# Patient Record
Sex: FEMALE | Race: WHITE | NOT HISPANIC OR LATINO | Employment: OTHER | URBAN - METROPOLITAN AREA
[De-identification: names, ages, dates, MRNs, and addresses within clinical notes are randomized per-mention and may not be internally consistent; named-entity substitution may affect disease eponyms.]

---

## 2017-03-27 ENCOUNTER — HOSPITAL ENCOUNTER (OUTPATIENT)
Dept: RADIOLOGY | Facility: CLINIC | Age: 62
Discharge: HOME | End: 2017-03-27
Attending: OBSTETRICS & GYNECOLOGY
Payer: COMMERCIAL

## 2017-03-27 ENCOUNTER — OFFICE VISIT (OUTPATIENT)
Dept: OBSTETRICS AND GYNECOLOGY | Facility: CLINIC | Age: 62
End: 2017-03-27
Payer: COMMERCIAL

## 2017-03-27 VITALS — DIASTOLIC BLOOD PRESSURE: 82 MMHG | WEIGHT: 170 LBS | BODY MASS INDEX: 31.6 KG/M2 | SYSTOLIC BLOOD PRESSURE: 134 MMHG

## 2017-03-27 DIAGNOSIS — Z01.419 ROUTINE GYNECOLOGICAL EXAMINATION: Primary | ICD-10-CM

## 2017-03-27 DIAGNOSIS — Z12.31 VISIT FOR SCREENING MAMMOGRAM: ICD-10-CM

## 2017-03-27 DIAGNOSIS — N76.1 CHRONIC VAGINITIS: ICD-10-CM

## 2017-03-27 DIAGNOSIS — Z11.51 SCREENING FOR HPV (HUMAN PAPILLOMAVIRUS): ICD-10-CM

## 2017-03-27 DIAGNOSIS — Z12.4 CERVICAL CANCER SCREENING: ICD-10-CM

## 2017-03-27 PROCEDURE — 77067 SCR MAMMO BI INCL CAD: CPT | Mod: 26,,, | Performed by: RADIOLOGY

## 2017-03-27 PROCEDURE — 3079F DIAST BP 80-89 MM HG: CPT | Mod: S$GLB,,, | Performed by: OBSTETRICS & GYNECOLOGY

## 2017-03-27 PROCEDURE — 99999 PR PBB SHADOW E&M-NEW PATIENT-LVL III: CPT | Mod: PBBFAC,,, | Performed by: OBSTETRICS & GYNECOLOGY

## 2017-03-27 PROCEDURE — 77067 SCR MAMMO BI INCL CAD: CPT | Mod: TC

## 2017-03-27 PROCEDURE — 88175 CYTOPATH C/V AUTO FLUID REDO: CPT

## 2017-03-27 PROCEDURE — 99386 PREV VISIT NEW AGE 40-64: CPT | Mod: S$GLB,,, | Performed by: OBSTETRICS & GYNECOLOGY

## 2017-03-27 PROCEDURE — 3075F SYST BP GE 130 - 139MM HG: CPT | Mod: S$GLB,,, | Performed by: OBSTETRICS & GYNECOLOGY

## 2017-03-27 NOTE — MR AVS SNAPSHOT
[facility]  [address] [place] [address]  Phone: [phone]                  Leticia Trevino   3/27/2017 10:00 AM   Office Visit    Description:  Female : 1955   Provider:  Dedra Gómez MD   Department:  [facility]           Reason for Visit     Establish Care     Well Woman           Diagnoses this Visit        Comments    Cervical cancer screening    -  Primary     Screening for HPV (human papillomavirus)         Visit for screening mammogram                To Do List           Goals (5 Years of Data)     None      [facility]     [facility] Care [facility] -  Assistance  Registered nurses in the [facility] provide clinical advisement, health education, appointment booking, and other advisory services.  Call for this free service at [phone].             Medications           Message regarding Medications     Verify the changes and/or additions to your medication regime listed below are the same as discussed with your clinician today.  If any of these changes or additions are incorrect, please notify your healthcare provider.             Verify that the below list of medications is an accurate representation of the medications you are currently taking.  If none reported, the list may be blank. If incorrect, please contact your healthcare provider. Carry this list with you in case of emergency.           Current Medications     enalapril (VASOTEC) 2.5 MG tablet Take 2.5 mg by mouth.    FERROUS FUMARATE/IRON PS CPLX (FERROUS FUMARATE-IRON PS CMPLX ORAL) Take by mouth.    alprazolam (XANAX) 0.25 MG tablet Take 1 tablet (0.25 mg total) by mouth 2 (two) times daily as needed for Anxiety.           Clinical Reference Information           Your Vitals Were     BP Weight BMI          134/82 77.1 kg (169 lb 15.6 oz) 31.6 kg/m2        Blood Pressure          Most Recent Value    BP  134/82      Allergies as of 3/27/2017     Sulfa  (Sulfonamide Antibiotics)      Immunizations Administered on Date of Encounter - 3/27/2017     None      Orders Placed During Today's Visit      Normal Orders This Visit    HPV High Risk Genotypes, PCR     Liquid-based pap smear, screening     Future Labs/Procedures Expected by Expires    Mammo Digital Screening Bilat With CAD  3/27/2017 5/27/2018      [facility] Sign-Up     Activating your [facility] account is as easy as 1-2-3!     1) Visit [email or web address], select Sign Up Now, enter this activation code and your date of birth, then select Next.  [email or web address]  Expires: 5/11/2017 10:00 AM      2) Create a username and password to use when you visit [facility] in the future and select a security question in case you lose your password and select Next.    3) Enter your e-mail address and click Sign Up!    Additional Information  If you have questions, please e-mail [email or web address] or call [phone] to talk to our [facility] staff. Remember, [facility] is NOT to be used for urgent needs. For medical emergencies, dial 911.         Language Assistance Services     ATTENTION: Language assistance services are available, free of charge. Please call [phone].      ATENCIÓN: Si habla donita, tiene a ventura disposición servicios gratuitos de asistencia lingüística. Llame al [phone].     CHÚ Ý: N?u b?n nói Ti?ng Vi?t, có các d?ch v? h? tr? ngôn ng? mi?n phí dành cho b?n. G?i s? [phone].         [facility] complies with applicable Federal civil rights laws and does not discriminate on the basis of race, color, national origin, age, disability, or sex.

## 2017-03-27 NOTE — PROGRESS NOTES
Chief Complaint   Patient presents with    [facility]    Well Woman       History of Present Illness: Leticia Trevino is a 62 y.o. female that presents today 3/27/2017 for well gyn visit.    Past Medical History:   Diagnosis Date    Hypertension        Past Surgical History:   Procedure Laterality Date    APPENDECTOMY      FOOT SURGERY Left     TONSILLECTOMY         Current Outpatient Prescriptions   Medication Sig Dispense Refill    enalapril (VASOTEC) 2.5 MG tablet Take 2.5 mg by mouth.      FERROUS FUMARATE/IRON PS CPLX (FERROUS FUMARATE-IRON PS CMPLX ORAL) Take by mouth.      alprazolam (XANAX) 0.25 MG tablet Take 1 tablet (0.25 mg total) by mouth 2 (two) times daily as needed for Anxiety. 30 tablet 1     No current facility-administered medications for this visit.        Review of patient's allergies indicates:   Allergen Reactions    Sulfa (sulfonamide antibiotics) Hives       Family History   Problem Relation Age of Onset    Stroke Mother     Stroke Father     Cancer Sister     Breast cancer Sister 65    Ovarian cancer Neg Hx        Social History     Social History    Marital status:      Spouse name: N/A    Number of children: N/A    Years of education: N/A     Occupational History           retired [employer]      Social History Main Topics    Smoking status: Former Smoker     Types: Cigarettes     Quit date:     Smokeless tobacco: None    Alcohol use Yes      Comment: occasionally    Drug use: No    Sexual activity: Not Asked     Other Topics Concern    None     Social History Narrative       OB History    Para Term  AB SAB TAB Ectopic Multiple Living   3 3        3      # Outcome Date GA Lbr Polo/2nd Weight Sex Delivery Anes PTL Lv   3 Para      Vag-Spont      2 Para      Vag-Spont      1 Para      Vag-Spont             Review of Symptoms:  GENERAL: Denies weight gain or weight loss. Feeling well overall.   SKIN: Denies rash or lesions.    HEAD: Denies head injury or headache.   NODES: Denies enlarged lymph nodes.   CHEST: Denies chest pain or shortness of breath.   CARDIOVASCULAR: Denies palpitations or left sided chest pain.   ABDOMEN: No abdominal pain, constipation, diarrhea, nausea, vomiting or rectal bleeding.   URINARY: No frequency, dysuria, hematuria, or burning on urination.  HEMATOLOGIC: No easy bruisability or excessive bleeding.   MUSCULOSKELETAL: Denies joint pain or swelling.     /82  Wt 77.1 kg (169 lb 15.6 oz)  BMI 31.6 kg/m2  Physical Exam:  APPEARANCE: Well nourished, well developed, in no acute distress.  SKIN: Normal skin turgor, no lesions.  NECK: Neck symmetric without masses   RESPIRATORY: Normal respiratory effort with no retractions or use of accessory muscles  CARDIOVASCULAR: Peripheral vascular system with no swelling no varicosities and palpation of pulses normal  LYMPHATIC: No enlargements of the lymph nodes noted in the neck, axillae, or groin  ABDOMEN: Soft. No tenderness or masses. No hepatosplenomegaly. No hernias.  BREASTS: Symmetrical, no skin changes or visible lesions. No palpable masses, nipple discharge or adenopathy bilaterally.  PELVIC: Normal external female genitalia without lesions. Normal hair distribution. Adequate perineal body, normal urethral meatus. Urethra with no masses.  Bladder nontender. Vagina moist and well rugated without lesions or discharge. Cervix pink and without lesions. No significant cystocele or rectocele. Bimanual exam showed uterus normal size, shape, position, mobile and nontender. Adnexa without masses or tenderness. Urethra and bladder normal. PAP DONE  EXTREMITIES: No clubbing cyanosis or edema.    ASSESSMENT/PLAN:  Routine gynecological examination    Cervical cancer screening  -     Liquid-based pap smear, screening    Screening for HPV (human papillomavirus)  -     HPV High Risk Genotypes, PCR    Visit for screening mammogram  -     Mammo Digital Screening Bilat With  CAD; Future; Expected date: 3/27/17    Chronic vaginitis          Patient was counseled today on Pap guidelines, recommendation for pelvic exams, mammograms every other year after the age of 40 and annually after the age of 50, Colonoscopy after the age of 50, Dexa Bone Scan and calcium and vitamin D supplementation in menopause and to see her PCP for other health maintenance.   FOLLOW-UP:prn

## 2017-03-31 LAB
HPV16 DNA SPEC QL NAA+PROBE: NEGATIVE
HPV16+18+H RISK 12 DNA CVX-IMP: NEGATIVE
HPV18 DNA SPEC QL NAA+PROBE: NEGATIVE

## 2021-04-16 ENCOUNTER — LAB VISIT (OUTPATIENT)
Dept: LAB | Facility: HOSPITAL | Age: 66
End: 2021-04-16
Attending: FAMILY MEDICINE
Payer: MEDICARE

## 2021-04-16 ENCOUNTER — OFFICE VISIT (OUTPATIENT)
Dept: FAMILY MEDICINE | Facility: CLINIC | Age: 66
End: 2021-04-16
Attending: FAMILY MEDICINE
Payer: MEDICARE

## 2021-04-16 VITALS
OXYGEN SATURATION: 95 % | SYSTOLIC BLOOD PRESSURE: 122 MMHG | BODY MASS INDEX: 39.15 KG/M2 | HEIGHT: 62 IN | TEMPERATURE: 98 F | DIASTOLIC BLOOD PRESSURE: 78 MMHG | WEIGHT: 212.75 LBS | HEART RATE: 86 BPM

## 2021-04-16 DIAGNOSIS — Z13.820 OSTEOPOROSIS SCREENING: ICD-10-CM

## 2021-04-16 DIAGNOSIS — R73.01 IFG (IMPAIRED FASTING GLUCOSE): Primary | ICD-10-CM

## 2021-04-16 DIAGNOSIS — R73.01 IFG (IMPAIRED FASTING GLUCOSE): ICD-10-CM

## 2021-04-16 DIAGNOSIS — R06.83 SNORES: ICD-10-CM

## 2021-04-16 DIAGNOSIS — N18.30 STAGE 3 CHRONIC KIDNEY DISEASE, UNSPECIFIED WHETHER STAGE 3A OR 3B CKD: ICD-10-CM

## 2021-04-16 DIAGNOSIS — E78.5 DYSLIPIDEMIA: ICD-10-CM

## 2021-04-16 DIAGNOSIS — E66.01 SEVERE OBESITY (BMI 35.0-39.9) WITH COMORBIDITY: ICD-10-CM

## 2021-04-16 DIAGNOSIS — Z78.0 ASYMPTOMATIC MENOPAUSAL STATE: ICD-10-CM

## 2021-04-16 DIAGNOSIS — I10 HYPERTENSION, UNSPECIFIED TYPE: ICD-10-CM

## 2021-04-16 LAB
ESTIMATED AVG GLUCOSE: 108 MG/DL (ref 68–131)
HBA1C MFR BLD: 5.4 % (ref 4–5.6)

## 2021-04-16 PROCEDURE — 99999 PR PBB SHADOW E&M-EST. PATIENT-LVL V: ICD-10-PCS | Mod: PBBFAC,,, | Performed by: FAMILY MEDICINE

## 2021-04-16 PROCEDURE — 99204 OFFICE O/P NEW MOD 45 MIN: CPT | Mod: S$PBB,,, | Performed by: FAMILY MEDICINE

## 2021-04-16 PROCEDURE — 99215 OFFICE O/P EST HI 40 MIN: CPT | Mod: PBBFAC,PO | Performed by: FAMILY MEDICINE

## 2021-04-16 PROCEDURE — 99999 PR PBB SHADOW E&M-EST. PATIENT-LVL V: CPT | Mod: PBBFAC,,, | Performed by: FAMILY MEDICINE

## 2021-04-16 PROCEDURE — 83036 HEMOGLOBIN GLYCOSYLATED A1C: CPT | Performed by: FAMILY MEDICINE

## 2021-04-16 PROCEDURE — 99204 PR OFFICE/OUTPT VISIT, NEW, LEVL IV, 45-59 MIN: ICD-10-PCS | Mod: S$PBB,,, | Performed by: FAMILY MEDICINE

## 2021-04-16 PROCEDURE — 36415 COLL VENOUS BLD VENIPUNCTURE: CPT | Mod: PO | Performed by: FAMILY MEDICINE

## 2021-04-16 RX ORDER — CLOTRIMAZOLE AND BETAMETHASONE DIPROPIONATE 10; .64 MG/G; MG/G
CREAM TOPICAL 2 TIMES DAILY
COMMUNITY
End: 2022-03-04 | Stop reason: SDUPTHER

## 2021-04-16 RX ORDER — TRIAMCINOLONE ACETONIDE 0.25 MG/G
CREAM TOPICAL 2 TIMES DAILY
COMMUNITY
End: 2022-03-04 | Stop reason: SDUPTHER

## 2021-04-27 ENCOUNTER — HOSPITAL ENCOUNTER (OUTPATIENT)
Dept: RADIOLOGY | Facility: HOSPITAL | Age: 66
Discharge: HOME | End: 2021-04-27
Attending: FAMILY MEDICINE
Payer: MEDICARE

## 2021-04-27 DIAGNOSIS — Z78.0 ASYMPTOMATIC MENOPAUSAL STATE: ICD-10-CM

## 2021-04-27 DIAGNOSIS — Z13.820 OSTEOPOROSIS SCREENING: ICD-10-CM

## 2021-04-27 PROCEDURE — 77080 DXA BONE DENSITY AXIAL: CPT | Mod: 26,,, | Performed by: RADIOLOGY

## 2021-04-27 PROCEDURE — 77080 DXA BONE DENSITY AXIAL: CPT | Mod: TC,PO

## 2021-04-27 PROCEDURE — 77080 DEXA BONE DENSITY SPINE HIP: ICD-10-PCS | Mod: 26,,, | Performed by: RADIOLOGY

## 2021-05-31 ENCOUNTER — OFFICE VISIT (OUTPATIENT)
Dept: OPHTHALMOLOGY | Facility: CLINIC | Age: 66
End: 2021-05-31
Payer: MEDICARE

## 2021-05-31 DIAGNOSIS — H52.223 REGULAR ASTIGMATISM OF BOTH EYES: ICD-10-CM

## 2021-05-31 DIAGNOSIS — H25.13 NUCLEAR SCLEROTIC CATARACT OF BOTH EYES: ICD-10-CM

## 2021-05-31 DIAGNOSIS — H43.811 POSTERIOR VITREOUS DETACHMENT OF RIGHT EYE: Primary | ICD-10-CM

## 2021-05-31 PROCEDURE — 92004 PR EYE EXAM, NEW PATIENT,COMPREHESV: ICD-10-PCS | Mod: S$PBB,,, | Performed by: OPTOMETRIST

## 2021-05-31 PROCEDURE — 99999 PR PBB SHADOW E&M-EST. PATIENT-LVL II: ICD-10-PCS | Mod: PBBFAC,,, | Performed by: OPTOMETRIST

## 2021-05-31 PROCEDURE — 92004 COMPRE OPH EXAM NEW PT 1/>: CPT | Mod: S$PBB,,, | Performed by: OPTOMETRIST

## 2021-05-31 PROCEDURE — 99999 PR PBB SHADOW E&M-EST. PATIENT-LVL II: CPT | Mod: PBBFAC,,, | Performed by: OPTOMETRIST

## 2021-05-31 PROCEDURE — 92015 DETERMINE REFRACTIVE STATE: CPT | Mod: ,,, | Performed by: OPTOMETRIST

## 2021-05-31 PROCEDURE — 99212 OFFICE O/P EST SF 10 MIN: CPT | Mod: PBBFAC | Performed by: OPTOMETRIST

## 2021-05-31 PROCEDURE — 92015 PR REFRACTION: ICD-10-PCS | Mod: ,,, | Performed by: OPTOMETRIST

## 2021-06-23 ENCOUNTER — OFFICE VISIT (OUTPATIENT)
Dept: PULMONOLOGY | Facility: CLINIC | Age: 66
End: 2021-06-23
Payer: MEDICARE

## 2021-06-23 VITALS
HEIGHT: 61 IN | BODY MASS INDEX: 40.69 KG/M2 | SYSTOLIC BLOOD PRESSURE: 140 MMHG | OXYGEN SATURATION: 96 % | WEIGHT: 215.5 LBS | DIASTOLIC BLOOD PRESSURE: 86 MMHG | HEART RATE: 62 BPM | RESPIRATION RATE: 18 BRPM

## 2021-06-23 DIAGNOSIS — R06.83 SNORES: ICD-10-CM

## 2021-06-23 DIAGNOSIS — G47.30 SLEEP-DISORDERED BREATHING: Primary | ICD-10-CM

## 2021-06-23 DIAGNOSIS — E66.01 CLASS 3 SEVERE OBESITY DUE TO EXCESS CALORIES WITH SERIOUS COMORBIDITY AND BODY MASS INDEX (BMI) OF 40.0 TO 44.9 IN ADULT: ICD-10-CM

## 2021-06-23 DIAGNOSIS — I10 HYPERTENSION, UNSPECIFIED TYPE: ICD-10-CM

## 2021-06-23 PROBLEM — E66.813 CLASS 3 SEVERE OBESITY DUE TO EXCESS CALORIES WITH SERIOUS COMORBIDITY AND BODY MASS INDEX (BMI) OF 40.0 TO 44.9 IN ADULT: Status: ACTIVE | Noted: 2021-04-16

## 2021-06-23 PROCEDURE — 99204 OFFICE O/P NEW MOD 45 MIN: CPT | Mod: S$PBB,,, | Performed by: NURSE PRACTITIONER

## 2021-06-23 PROCEDURE — 99999 PR PBB SHADOW E&M-EST. PATIENT-LVL V: CPT | Mod: PBBFAC,,, | Performed by: NURSE PRACTITIONER

## 2021-06-23 PROCEDURE — 99215 OFFICE O/P EST HI 40 MIN: CPT | Mod: PBBFAC | Performed by: NURSE PRACTITIONER

## 2021-06-23 PROCEDURE — 99999 PR PBB SHADOW E&M-EST. PATIENT-LVL V: ICD-10-PCS | Mod: PBBFAC,,, | Performed by: NURSE PRACTITIONER

## 2021-06-23 PROCEDURE — 99204 PR OFFICE/OUTPT VISIT, NEW, LEVL IV, 45-59 MIN: ICD-10-PCS | Mod: S$PBB,,, | Performed by: NURSE PRACTITIONER

## 2021-06-28 ENCOUNTER — HOSPITAL ENCOUNTER (OUTPATIENT)
Dept: SLEEP MEDICINE | Facility: HOSPITAL | Age: 66
Discharge: HOME | End: 2021-06-28
Attending: NURSE PRACTITIONER
Payer: MEDICARE

## 2021-06-28 DIAGNOSIS — G47.30 SLEEP-DISORDERED BREATHING: ICD-10-CM

## 2021-06-28 DIAGNOSIS — G47.20 CIRCADIAN RHYTHM DISORDER: ICD-10-CM

## 2021-06-28 DIAGNOSIS — G47.36 NOCTURNAL HYPOXEMIA DUE TO OBESITY: ICD-10-CM

## 2021-06-28 DIAGNOSIS — G47.61 PLMD (PERIODIC LIMB MOVEMENT DISORDER): ICD-10-CM

## 2021-06-28 DIAGNOSIS — G47.00 INSOMNIA, UNSPECIFIED TYPE: ICD-10-CM

## 2021-06-28 DIAGNOSIS — E66.9 NOCTURNAL HYPOXEMIA DUE TO OBESITY: ICD-10-CM

## 2021-06-28 DIAGNOSIS — I10 HYPERTENSION, UNSPECIFIED TYPE: ICD-10-CM

## 2021-06-28 DIAGNOSIS — R06.83 SNORES: ICD-10-CM

## 2021-06-28 DIAGNOSIS — E66.01 CLASS 3 SEVERE OBESITY DUE TO EXCESS CALORIES WITH SERIOUS COMORBIDITY AND BODY MASS INDEX (BMI) OF 40.0 TO 44.9 IN ADULT: ICD-10-CM

## 2021-06-28 PROCEDURE — 95810 POLYSOM 6/> YRS 4/> PARAM: CPT | Mod: 26,,, | Performed by: INTERNAL MEDICINE

## 2021-06-28 PROCEDURE — 95810 POLYSOM 6/> YRS 4/> PARAM: CPT

## 2021-06-28 PROCEDURE — 95810 PR POLYSOMNOGRAPHY, 4 OR MORE: ICD-10-PCS | Mod: 26,,, | Performed by: INTERNAL MEDICINE

## 2021-07-06 DIAGNOSIS — G47.36 NOCTURNAL HYPOXEMIA DUE TO OBESITY: Primary | ICD-10-CM

## 2021-07-06 DIAGNOSIS — F45.8 OTHER SOMATOFORM DISORDERS: ICD-10-CM

## 2021-07-06 DIAGNOSIS — E66.9 NOCTURNAL HYPOXEMIA DUE TO OBESITY: Primary | ICD-10-CM

## 2021-07-06 DIAGNOSIS — E66.2 MORBID (SEVERE) OBESITY WITH ALVEOLAR HYPOVENTILATION: ICD-10-CM

## 2021-07-06 DIAGNOSIS — G47.61 PLMD (PERIODIC LIMB MOVEMENT DISORDER): ICD-10-CM

## 2021-07-06 DIAGNOSIS — E66.01 CLASS 3 SEVERE OBESITY DUE TO EXCESS CALORIES WITH SERIOUS COMORBIDITY AND BODY MASS INDEX (BMI) OF 40.0 TO 44.9 IN ADULT: ICD-10-CM

## 2021-07-07 ENCOUNTER — TELEPHONE (OUTPATIENT)
Dept: PULMONOLOGY | Facility: CLINIC | Age: 66
End: 2021-07-07

## 2021-07-07 NOTE — TELEPHONE ENCOUNTER
Left message for pt with results and to call back so her labs can be scheduled at the best time for her.

## 2021-07-08 ENCOUNTER — PATIENT MESSAGE (OUTPATIENT)
Dept: PULMONOLOGY | Facility: CLINIC | Age: 66
End: 2021-07-08

## 2021-07-09 ENCOUNTER — TELEPHONE (OUTPATIENT)
Dept: PULMONOLOGY | Facility: CLINIC | Age: 66
End: 2021-07-09

## 2021-07-09 NOTE — TELEPHONE ENCOUNTER
Pt was offered same day mahsa, but pt is on the way out of town. Pt stated once she gets back she will call to have an overnight scheduled for her oxygen.

## 2021-08-01 ENCOUNTER — PATIENT MESSAGE (OUTPATIENT)
Dept: PULMONOLOGY | Facility: CLINIC | Age: 66
End: 2021-08-01

## 2021-08-01 ENCOUNTER — PATIENT MESSAGE (OUTPATIENT)
Dept: FAMILY MEDICINE | Facility: CLINIC | Age: 66
End: 2021-08-01

## 2021-08-01 DIAGNOSIS — G47.36 NOCTURNAL HYPOXEMIA DUE TO OBESITY: Primary | ICD-10-CM

## 2021-08-01 DIAGNOSIS — E66.2 MORBID (SEVERE) OBESITY WITH ALVEOLAR HYPOVENTILATION: ICD-10-CM

## 2021-08-01 DIAGNOSIS — E66.9 NOCTURNAL HYPOXEMIA DUE TO OBESITY: Primary | ICD-10-CM

## 2021-08-02 RX ORDER — ENALAPRIL MALEATE 2.5 MG/1
2.5 TABLET ORAL DAILY
Qty: 90 TABLET | Refills: 0 | Status: SHIPPED | OUTPATIENT
Start: 2021-08-02 | End: 2021-11-08

## 2021-08-06 ENCOUNTER — CLINICAL SUPPORT (OUTPATIENT)
Dept: PULMONOLOGY | Facility: CLINIC | Age: 66
End: 2021-08-06
Payer: MEDICARE

## 2021-08-06 DIAGNOSIS — E66.9 NOCTURNAL HYPOXEMIA DUE TO OBESITY: ICD-10-CM

## 2021-08-06 DIAGNOSIS — G47.36 NOCTURNAL HYPOXEMIA DUE TO OBESITY: ICD-10-CM

## 2021-08-06 DIAGNOSIS — E66.2 MORBID (SEVERE) OBESITY WITH ALVEOLAR HYPOVENTILATION: ICD-10-CM

## 2021-08-06 PROCEDURE — 94762 N-INVAS EAR/PLS OXIMTRY CONT: CPT | Mod: PBBFAC

## 2021-08-13 ENCOUNTER — PATIENT MESSAGE (OUTPATIENT)
Dept: PULMONOLOGY | Facility: CLINIC | Age: 66
End: 2021-08-13

## 2021-08-13 DIAGNOSIS — G47.36 NOCTURNAL HYPOXEMIA DUE TO OBESITY: Primary | ICD-10-CM

## 2021-08-13 DIAGNOSIS — E66.9 NOCTURNAL HYPOXEMIA DUE TO OBESITY: Primary | ICD-10-CM

## 2021-08-13 DIAGNOSIS — E66.01 CLASS 3 SEVERE OBESITY DUE TO EXCESS CALORIES WITH SERIOUS COMORBIDITY AND BODY MASS INDEX (BMI) OF 40.0 TO 44.9 IN ADULT: ICD-10-CM

## 2021-08-13 DIAGNOSIS — E66.2 MORBID (SEVERE) OBESITY WITH ALVEOLAR HYPOVENTILATION: ICD-10-CM

## 2021-08-24 ENCOUNTER — OFFICE VISIT (OUTPATIENT)
Dept: PULMONOLOGY | Facility: CLINIC | Age: 66
End: 2021-08-24
Payer: MEDICARE

## 2021-08-24 VITALS — BODY MASS INDEX: 38.71 KG/M2 | WEIGHT: 205 LBS | HEIGHT: 61 IN

## 2021-08-24 DIAGNOSIS — E66.2 CLASS 2 OBESITY WITH ALVEOLAR HYPOVENTILATION, SERIOUS COMORBIDITY, AND BODY MASS INDEX (BMI) OF 38.0 TO 38.9 IN ADULT: ICD-10-CM

## 2021-08-24 DIAGNOSIS — G47.36 NOCTURNAL HYPOXEMIA DUE TO OBESITY: Primary | ICD-10-CM

## 2021-08-24 DIAGNOSIS — E66.9 NOCTURNAL HYPOXEMIA DUE TO OBESITY: Primary | ICD-10-CM

## 2021-08-24 DIAGNOSIS — G47.61 PLMD (PERIODIC LIMB MOVEMENT DISORDER): ICD-10-CM

## 2021-08-24 PROBLEM — E66.01 CLASS 2 SEVERE OBESITY DUE TO EXCESS CALORIES WITH SERIOUS COMORBIDITY AND BODY MASS INDEX (BMI) OF 38.0 TO 38.9 IN ADULT: Status: ACTIVE | Noted: 2021-07-06

## 2021-08-24 PROBLEM — E66.812 CLASS 2 OBESITY WITH ALVEOLAR HYPOVENTILATION, SERIOUS COMORBIDITY, AND BODY MASS INDEX (BMI) OF 38.0 TO 38.9 IN ADULT: Status: ACTIVE | Noted: 2021-04-16

## 2021-08-24 PROBLEM — E66.812 CLASS 2 SEVERE OBESITY DUE TO EXCESS CALORIES WITH SERIOUS COMORBIDITY AND BODY MASS INDEX (BMI) OF 38.0 TO 38.9 IN ADULT: Status: ACTIVE | Noted: 2021-07-06

## 2021-08-24 PROCEDURE — 99214 OFFICE O/P EST MOD 30 MIN: CPT | Mod: 95,,, | Performed by: NURSE PRACTITIONER

## 2021-08-24 PROCEDURE — 99214 PR OFFICE/OUTPT VISIT, EST, LEVL IV, 30-39 MIN: ICD-10-PCS | Mod: 95,,, | Performed by: NURSE PRACTITIONER

## 2021-09-09 ENCOUNTER — PATIENT MESSAGE (OUTPATIENT)
Dept: PULMONOLOGY | Facility: CLINIC | Age: 66
End: 2021-09-09

## 2021-10-18 ENCOUNTER — PATIENT MESSAGE (OUTPATIENT)
Dept: FAMILY MEDICINE | Facility: CLINIC | Age: 66
End: 2021-10-18
Payer: MEDICARE

## 2021-10-25 ENCOUNTER — PATIENT MESSAGE (OUTPATIENT)
Dept: FAMILY MEDICINE | Facility: CLINIC | Age: 66
End: 2021-10-25
Payer: MEDICARE

## 2021-10-30 ENCOUNTER — IMMUNIZATION (OUTPATIENT)
Dept: FAMILY MEDICINE | Facility: CLINIC | Age: 66
End: 2021-10-30
Payer: MEDICARE

## 2021-10-30 PROCEDURE — G0008 ADMIN INFLUENZA VIRUS VAC: HCPCS | Mod: PBBFAC,PO

## 2021-10-30 PROCEDURE — 90694 VACC AIIV4 NO PRSRV 0.5ML IM: CPT | Mod: PBBFAC,PO

## 2022-01-03 ENCOUNTER — PATIENT MESSAGE (OUTPATIENT)
Dept: PULMONOLOGY | Facility: CLINIC | Age: 67
End: 2022-01-03
Payer: MEDICARE

## 2022-01-31 ENCOUNTER — PATIENT MESSAGE (OUTPATIENT)
Dept: PULMONOLOGY | Facility: CLINIC | Age: 67
End: 2022-01-31
Payer: MEDICARE

## 2022-01-31 ENCOUNTER — PATIENT MESSAGE (OUTPATIENT)
Dept: FAMILY MEDICINE | Facility: CLINIC | Age: 67
End: 2022-01-31
Payer: MEDICARE

## 2022-01-31 RX ORDER — ENALAPRIL MALEATE 2.5 MG/1
2.5 TABLET ORAL DAILY
Qty: 90 TABLET | Refills: 0 | Status: SHIPPED | OUTPATIENT
Start: 2022-01-31 | End: 2022-05-10 | Stop reason: SDUPTHER

## 2022-03-03 NOTE — TELEPHONE ENCOUNTER
Encounter details require adjustment(s)/ updating by [facility] Staff  As of this time CDM: did not populate or display   Adjustment(s) made: n/a information is pended correctly  CDM should display. Medication(s) delegated by the OR.  Will resend refill request encounter to P Centralized Refill Staff Pool.   [facility]   Note composed:1:48 PM 03/03/2022

## 2022-03-04 ENCOUNTER — PATIENT MESSAGE (OUTPATIENT)
Dept: FAMILY MEDICINE | Facility: CLINIC | Age: 67
End: 2022-03-04
Payer: MEDICARE

## 2022-03-04 RX ORDER — TRIAMCINOLONE ACETONIDE 0.25 MG/G
CREAM TOPICAL 2 TIMES DAILY
Qty: 15 G | Refills: 1 | Status: SHIPPED | OUTPATIENT
Start: 2022-03-04 | End: 2023-03-13

## 2022-03-04 RX ORDER — ENALAPRIL MALEATE 2.5 MG/1
TABLET ORAL
Qty: 90 TABLET | Refills: 0 | OUTPATIENT
Start: 2022-03-04

## 2022-03-04 RX ORDER — CLOTRIMAZOLE AND BETAMETHASONE DIPROPIONATE 10; .64 MG/G; MG/G
CREAM TOPICAL 2 TIMES DAILY
Qty: 15 G | Refills: 1 | Status: SHIPPED | OUTPATIENT
Start: 2022-03-04

## 2022-03-04 NOTE — TELEPHONE ENCOUNTER
Provider Staff:     Action required for this patient.    Please note Refusal of medication.            Requested Prescriptions     Refused Prescriptions Disp Refills    enalapril (VASOTEC) 2.5 MG tablet [Pharmacy Med Name: Enalapril Maleate 2.5 MG Oral Tablet] 90 tablet 0     Sig: Take 1 tablet by mouth once daily     Refused By: CHRISTINE RICHMOND     Reason for Refusal: Patient needs an appointment      Thanks!  [facility]   Note composed: 03/04/2022 7:27 AM

## 2022-05-01 ENCOUNTER — PATIENT MESSAGE (OUTPATIENT)
Dept: FAMILY MEDICINE | Facility: CLINIC | Age: 67
End: 2022-05-01
Payer: MEDICARE

## 2022-05-06 ENCOUNTER — OFFICE VISIT (OUTPATIENT)
Dept: FAMILY MEDICINE | Facility: CLINIC | Age: 67
End: 2022-05-06
Attending: FAMILY MEDICINE
Payer: MEDICARE

## 2022-05-06 ENCOUNTER — LAB VISIT (OUTPATIENT)
Dept: LAB | Facility: HOSPITAL | Age: 67
End: 2022-05-06
Attending: FAMILY MEDICINE
Payer: MEDICARE

## 2022-05-06 VITALS
BODY MASS INDEX: 34.04 KG/M2 | DIASTOLIC BLOOD PRESSURE: 80 MMHG | TEMPERATURE: 98 F | WEIGHT: 180.31 LBS | HEIGHT: 61 IN | OXYGEN SATURATION: 98 % | HEART RATE: 59 BPM | SYSTOLIC BLOOD PRESSURE: 120 MMHG

## 2022-05-06 DIAGNOSIS — E66.2 CLASS 2 OBESITY WITH ALVEOLAR HYPOVENTILATION, SERIOUS COMORBIDITY, AND BODY MASS INDEX (BMI) OF 38.0 TO 38.9 IN ADULT: ICD-10-CM

## 2022-05-06 DIAGNOSIS — Z12.39 ENCOUNTER FOR SCREENING FOR MALIGNANT NEOPLASM OF BREAST, UNSPECIFIED SCREENING MODALITY: Primary | ICD-10-CM

## 2022-05-06 DIAGNOSIS — I10 HYPERTENSION, UNSPECIFIED TYPE: ICD-10-CM

## 2022-05-06 DIAGNOSIS — N18.30 STAGE 3 CHRONIC KIDNEY DISEASE, UNSPECIFIED WHETHER STAGE 3A OR 3B CKD: ICD-10-CM

## 2022-05-06 DIAGNOSIS — R79.9 ABNORMAL FINDING OF BLOOD CHEMISTRY, UNSPECIFIED: ICD-10-CM

## 2022-05-06 DIAGNOSIS — Z12.31 ENCOUNTER FOR SCREENING MAMMOGRAM FOR MALIGNANT NEOPLASM OF BREAST: ICD-10-CM

## 2022-05-06 LAB
BASOPHILS # BLD AUTO: 0.04 K/UL (ref 0–0.2)
BASOPHILS NFR BLD: 0.7 % (ref 0–1.9)
DIFFERENTIAL METHOD: ABNORMAL
EOSINOPHIL # BLD AUTO: 0.1 K/UL (ref 0–0.5)
EOSINOPHIL NFR BLD: 1.7 % (ref 0–8)
ERYTHROCYTE [DISTWIDTH] IN BLOOD BY AUTOMATED COUNT: 13.8 % (ref 11.5–14.5)
ESTIMATED AVG GLUCOSE: 105 MG/DL (ref 68–131)
HBA1C MFR BLD: 5.3 % (ref 4–5.6)
HCT VFR BLD AUTO: 42.4 % (ref 37–48.5)
HGB BLD-MCNC: 13.4 G/DL (ref 12–16)
IMM GRANULOCYTES # BLD AUTO: 0.01 K/UL (ref 0–0.04)
IMM GRANULOCYTES NFR BLD AUTO: 0.2 % (ref 0–0.5)
LYMPHOCYTES # BLD AUTO: 1.1 K/UL (ref 1–4.8)
LYMPHOCYTES NFR BLD: 19.8 % (ref 18–48)
MCH RBC QN AUTO: 27.6 PG (ref 27–31)
MCHC RBC AUTO-ENTMCNC: 31.6 G/DL (ref 32–36)
MCV RBC AUTO: 87 FL (ref 82–98)
MONOCYTES # BLD AUTO: 0.4 K/UL (ref 0.3–1)
MONOCYTES NFR BLD: 7.5 % (ref 4–15)
NEUTROPHILS # BLD AUTO: 4 K/UL (ref 1.8–7.7)
NEUTROPHILS NFR BLD: 70.1 % (ref 38–73)
NRBC BLD-RTO: 0 /100 WBC
PLATELET # BLD AUTO: 211 K/UL (ref 150–450)
PMV BLD AUTO: 11.6 FL (ref 9.2–12.9)
RBC # BLD AUTO: 4.85 M/UL (ref 4–5.4)
WBC # BLD AUTO: 5.75 K/UL (ref 3.9–12.7)

## 2022-05-06 PROCEDURE — 99999 PR PBB SHADOW E&M-EST. PATIENT-LVL IV: ICD-10-PCS | Mod: PBBFAC,,, | Performed by: FAMILY MEDICINE

## 2022-05-06 PROCEDURE — 99999 PR PBB SHADOW E&M-EST. PATIENT-LVL IV: CPT | Mod: PBBFAC,,, | Performed by: FAMILY MEDICINE

## 2022-05-06 PROCEDURE — 85025 COMPLETE CBC W/AUTO DIFF WBC: CPT | Performed by: FAMILY MEDICINE

## 2022-05-06 PROCEDURE — 80061 LIPID PANEL: CPT | Performed by: FAMILY MEDICINE

## 2022-05-06 PROCEDURE — 99214 OFFICE O/P EST MOD 30 MIN: CPT | Mod: PBBFAC,PO | Performed by: FAMILY MEDICINE

## 2022-05-06 PROCEDURE — 83036 HEMOGLOBIN GLYCOSYLATED A1C: CPT | Performed by: FAMILY MEDICINE

## 2022-05-06 PROCEDURE — 36415 COLL VENOUS BLD VENIPUNCTURE: CPT | Mod: PO | Performed by: FAMILY MEDICINE

## 2022-05-06 PROCEDURE — 80053 COMPREHEN METABOLIC PANEL: CPT | Performed by: FAMILY MEDICINE

## 2022-05-06 PROCEDURE — 99214 PR OFFICE/OUTPT VISIT, EST, LEVL IV, 30-39 MIN: ICD-10-PCS | Mod: S$PBB,,, | Performed by: FAMILY MEDICINE

## 2022-05-06 PROCEDURE — 99214 OFFICE O/P EST MOD 30 MIN: CPT | Mod: S$PBB,,, | Performed by: FAMILY MEDICINE

## 2022-05-06 NOTE — PROGRESS NOTES
Subjective:       Patient ID: Leticia Trevino is a 67 y.o. female.    Chief Complaint: Annual Exam    67 y old female here for f.u . She has lost weight on a healthful diet . Wearing nocturnal oxygen . Avoids NSAIDS. Current opth exam . She will like to hold off on pneumonia vaccination today .     Review of Systems   Constitutional: Negative.  Negative for activity change and unexpected weight change.   HENT: Positive for hearing loss. Negative for rhinorrhea and trouble swallowing.    Eyes: Negative.  Negative for discharge and visual disturbance.   Respiratory: Negative.  Negative for chest tightness and wheezing.    Cardiovascular: Negative.  Negative for chest pain and palpitations.   Gastrointestinal: Negative.  Negative for blood in stool, constipation, diarrhea and vomiting.   Endocrine: Negative for polydipsia and polyuria.   Genitourinary: Negative.  Negative for difficulty urinating, dysuria, hematuria and menstrual problem.   Musculoskeletal: Negative.  Negative for arthralgias, joint swelling and neck pain.   Skin: Negative.    Neurological: Negative for weakness and headaches.   Hematological: Negative.    Psychiatric/Behavioral: Negative for confusion and dysphoric mood.       Objective:      Physical Exam  Constitutional:       General: She is not in acute distress.     Appearance: She is well-developed. She is not diaphoretic.   HENT:      Head: Normocephalic and atraumatic.      Right Ear: External ear normal.      Left Ear: External ear normal.      Mouth/Throat:      Pharynx: No oropharyngeal exudate.   Eyes:      General: No scleral icterus.        Right eye: No discharge.         Left eye: No discharge.      Conjunctiva/sclera: Conjunctivae normal.      Pupils: Pupils are equal, round, and reactive to light.   Neck:      Thyroid: No thyromegaly.      Vascular: No JVD.      Trachea: No tracheal deviation.   Cardiovascular:      Rate and Rhythm: Normal rate and regular rhythm.      Heart  sounds: Normal heart sounds. No murmur heard.    No friction rub. No gallop.   Pulmonary:      Effort: Pulmonary effort is normal. No respiratory distress.      Breath sounds: Normal breath sounds. No stridor. No wheezing or rales.   Chest:      Chest wall: No tenderness.   Abdominal:      General: Bowel sounds are normal. There is no distension.      Palpations: Abdomen is soft.      Tenderness: There is no abdominal tenderness. There is no guarding or rebound.   Musculoskeletal:         General: Normal range of motion.      Cervical back: Normal range of motion and neck supple.   Lymphadenopathy:      Cervical: No cervical adenopathy.   Skin:     General: Skin is warm and dry.   Neurological:      Mental Status: She is alert and oriented to person, place, and time.   Psychiatric:         Behavior: Behavior normal.         Thought Content: Thought content normal.         Judgment: Judgment normal.         Assessment:         Leticia was seen today for annual exam.    Diagnoses and all orders for this visit:    Encounter for screening for malignant neoplasm of breast, unspecified screening modality  -     Mammo Digital Screening Bilat; Future    Class 2 obesity with alveolar hypoventilation, serious comorbidity, and body mass index (BMI) of 38.0 to 38.9 in adult  -     CBC Auto Differential; Future  -     Comprehensive Metabolic Panel; Future  -     Hemoglobin A1C; Future  -     Lipid Panel; Future    Stage 3 chronic kidney disease, unspecified whether stage 3a or 3b CKD    Abnormal finding of blood chemistry, unspecified   -     Hemoglobin A1C; Future    Encounter for screening mammogram for malignant neoplasm of breast   -     Mammo Digital Screening Bilat; Future    Hypertension, unspecified type      Plan:     Leticia was seen today for annual exam.    Diagnoses and all orders for this visit:    Encounter for screening for malignant neoplasm of breast, unspecified screening modality  -     Mammo Digital Screening  Bilat; Future    Class 2 obesity with alveolar hypoventilation, serious comorbidity, and body mass index (BMI) of 38.0 to 38.9 in adult  -     CBC Auto Differential; Future  -     Comprehensive Metabolic Panel; Future  -     Hemoglobin A1C; Future  -     Lipid Panel; Future    Stage 3 chronic kidney disease, unspecified whether stage 3a or 3b CKD    Abnormal finding of blood chemistry, unspecified   -     Hemoglobin A1C; Future    Encounter for screening mammogram for malignant neoplasm of breast   -     Mammo Digital Screening Bilat; Future     Diet and exercise  Avoid NSAIDS  Controlled . Continue  meds

## 2022-05-07 LAB
ALBUMIN SERPL BCP-MCNC: 4 G/DL (ref 3.5–5.2)
ALP SERPL-CCNC: 48 U/L (ref 55–135)
ALT SERPL W/O P-5'-P-CCNC: 16 U/L (ref 10–44)
ANION GAP SERPL CALC-SCNC: 11 MMOL/L (ref 8–16)
AST SERPL-CCNC: 21 U/L (ref 10–40)
BILIRUB SERPL-MCNC: 0.6 MG/DL (ref 0.1–1)
BUN SERPL-MCNC: 16 MG/DL (ref 8–23)
CALCIUM SERPL-MCNC: 9.8 MG/DL (ref 8.7–10.5)
CHLORIDE SERPL-SCNC: 103 MMOL/L (ref 95–110)
CHOLEST SERPL-MCNC: 230 MG/DL (ref 120–199)
CHOLEST/HDLC SERPL: 3.2 {RATIO} (ref 2–5)
CO2 SERPL-SCNC: 27 MMOL/L (ref 23–29)
CREAT SERPL-MCNC: 0.9 MG/DL (ref 0.5–1.4)
EST. GFR  (AFRICAN AMERICAN): >60 ML/MIN/1.73 M^2
EST. GFR  (NON AFRICAN AMERICAN): >60 ML/MIN/1.73 M^2
GLUCOSE SERPL-MCNC: 76 MG/DL (ref 70–110)
HDLC SERPL-MCNC: 73 MG/DL (ref 40–75)
HDLC SERPL: 31.7 % (ref 20–50)
LDLC SERPL CALC-MCNC: 143 MG/DL (ref 63–159)
NONHDLC SERPL-MCNC: 157 MG/DL
POTASSIUM SERPL-SCNC: 4.6 MMOL/L (ref 3.5–5.1)
PROT SERPL-MCNC: 6.8 G/DL (ref 6–8.4)
SODIUM SERPL-SCNC: 141 MMOL/L (ref 136–145)
TRIGL SERPL-MCNC: 70 MG/DL (ref 30–150)

## 2022-05-09 ENCOUNTER — PATIENT MESSAGE (OUTPATIENT)
Dept: FAMILY MEDICINE | Facility: CLINIC | Age: 67
End: 2022-05-09
Payer: MEDICARE

## 2022-05-10 RX ORDER — ENALAPRIL MALEATE 2.5 MG/1
2.5 TABLET ORAL DAILY
Qty: 90 TABLET | Refills: 0 | Status: SHIPPED | OUTPATIENT
Start: 2022-05-10 | End: 2022-08-11

## 2022-05-10 NOTE — TELEPHONE ENCOUNTER
No new care gaps identified.  [facility] Embedded Care Gaps. Reference number: [id number]. 5/10/2022   9:06:46 AM MORIAHT

## 2022-05-13 ENCOUNTER — HOSPITAL ENCOUNTER (OUTPATIENT)
Dept: RADIOLOGY | Facility: HOSPITAL | Age: 67
Discharge: HOME | End: 2022-05-13
Attending: FAMILY MEDICINE
Payer: MEDICARE

## 2022-05-13 VITALS — BODY MASS INDEX: 34.04 KG/M2 | HEIGHT: 61 IN | WEIGHT: 180.31 LBS

## 2022-05-13 DIAGNOSIS — Z12.31 ENCOUNTER FOR SCREENING MAMMOGRAM FOR MALIGNANT NEOPLASM OF BREAST: ICD-10-CM

## 2022-05-13 DIAGNOSIS — Z12.39 ENCOUNTER FOR SCREENING FOR MALIGNANT NEOPLASM OF BREAST, UNSPECIFIED SCREENING MODALITY: ICD-10-CM

## 2022-05-13 PROCEDURE — 77063 BREAST TOMOSYNTHESIS BI: CPT | Mod: TC,PO

## 2022-05-13 PROCEDURE — 77063 MAMMO DIGITAL SCREENING BILAT WITH TOMO: ICD-10-PCS | Mod: 26,,, | Performed by: RADIOLOGY

## 2022-05-13 PROCEDURE — 77063 BREAST TOMOSYNTHESIS BI: CPT | Mod: 26,,, | Performed by: RADIOLOGY

## 2022-05-13 PROCEDURE — 77067 SCR MAMMO BI INCL CAD: CPT | Mod: 26,,, | Performed by: RADIOLOGY

## 2022-05-13 PROCEDURE — 77067 MAMMO DIGITAL SCREENING BILAT WITH TOMO: ICD-10-PCS | Mod: 26,,, | Performed by: RADIOLOGY

## 2022-05-13 PROCEDURE — 77067 SCR MAMMO BI INCL CAD: CPT | Mod: TC,PO

## 2022-05-15 ENCOUNTER — PATIENT MESSAGE (OUTPATIENT)
Dept: PULMONOLOGY | Facility: CLINIC | Age: 67
End: 2022-05-15
Payer: MEDICARE

## 2022-06-01 ENCOUNTER — PATIENT MESSAGE (OUTPATIENT)
Dept: PULMONOLOGY | Facility: CLINIC | Age: 67
End: 2022-06-01
Payer: MEDICARE

## 2022-06-01 DIAGNOSIS — E66.9 NOCTURNAL HYPOXEMIA DUE TO OBESITY: Primary | ICD-10-CM

## 2022-06-01 DIAGNOSIS — G47.36 NOCTURNAL HYPOXEMIA DUE TO OBESITY: Primary | ICD-10-CM

## 2022-06-01 NOTE — TELEPHONE ENCOUNTER
Orders Placed This Encounter   Procedures    PULSE OXIMETRY OVERNIGHT     Standing Status:   Future     Standing Expiration Date:   6/1/2023     Order Specific Question:   Reason for Test?     Answer:   O2 Qualification     Order Specific Question:   Symptoms:     Answer:   Obesity     Order Specific Question:   Oxygen Settings:     Answer:   na     Order Specific Question:   BiPAP Settings:     Answer:   na     Order Specific Question:   CPAP Settings:     Answer:   na     Order Specific Question:   Room Air:     Answer:   Yes

## 2022-06-17 ENCOUNTER — CLINICAL SUPPORT (OUTPATIENT)
Dept: PULMONOLOGY | Facility: CLINIC | Age: 67
End: 2022-06-17
Payer: MEDICARE

## 2022-06-17 DIAGNOSIS — E66.9 NOCTURNAL HYPOXEMIA DUE TO OBESITY: ICD-10-CM

## 2022-06-17 DIAGNOSIS — G47.36 NOCTURNAL HYPOXEMIA DUE TO OBESITY: ICD-10-CM

## 2022-06-17 PROCEDURE — 94762 N-INVAS EAR/PLS OXIMTRY CONT: CPT | Mod: PBBFAC

## 2022-06-20 NOTE — PROCEDURES
[address] * [place] [address]  Telephone: [phone]  Test date: 22 Start: 22 21:53:16 Leticia Trevino  Doctor: ARIANA Hernandez End: 22 05:07:40 [id number]  Oximetry: Summary Report  Comments: Room air.  Recording time: 07:14:24 Highest pulse: 93 Highest SpO2: 97%  Excluded samplin:00:04 Lowest pulse: 47 Lowest SpO2: 84%  Total valid samplin:14:20 Mean pulse: 65 Mean SpO2: 90.8%  1 S.D.: 9.1 1 S.D.: 1.7  Time with SpO2<90: 1:16:24, 17.6%  Time with SpO2<80: 0:00:00, 0.0%  Time with SpO2<70: 0:00:00, 0.0%  Time with SpO2<60: 0:00:00, 0.0%  Time with SpO2<89: 0:31:12, 7.2%  Time with SpO2 =>90: 5:57:56, 82.4%  Time with SpO2=>80 & <90: 1:16:24, 17.6%  Time with SpO2=>70 & <80: 0:00:00, 0.0%  Time with SpO2=>60 & <70: 0:00:00, 0.0%  The longest continuous time with saturation <=88 was 00:03:52, which started at  22 02:03:24.  A desaturation event was defined as a decrease of saturation by 4 or more.  No events were excluded due to artifact.  There were 18 desaturation events over 3 minutes duration.  There were 20 desaturation events of less than 3 minutes duration during which:  The mean high was 92.4%. The mean low was 87.3%.  The number of these events that were:  > 0 & <10 seconds: 0 > 0 seconds: 20  =>10 & <20 seconds: 2 =>10 seconds: 20  =>20 & <30 seconds: 5 =>20 seconds: 18  =>30 & <40 seconds: 2 =>30 seconds: 13  =>40 & <50 seconds: 1 =>40 seconds: 11  =>50 & <60 seconds: 2 =>50 seconds: 10  =>60 seconds: 8 =>60 seconds: 8  The mean length of desaturation events that were >=10 sec & <=3 mins was: 60.4 sec.  Desaturation event index (events >=10 sec per sampled hour): 2.8  Desaturation event index (events >= 0 sec per sampled hour): 2.8  © -    Overnight Oxygen Saturation Study:    INTERPRETATION:  Conditions of Test: Noted above in report    Interpretation of results of overnight oxygen saturation study.  This was a technically  adequate study.  Overnight  oxygen saturation study is abnormal with O2 saturation less than 89%.   Time with SpO2<89: 0:31:12, 7.2% of the study period. Lowest oxygen saturation recorded was 84% .    Based on the above information patient meets criteria for oxygen prescription.  Clinical correlation suggested.  Norman Muñiz MD    Medicare Criteria Comments:   Overnight Oximetry test results suggest the patient does fall under Medicare Group 1 Criteria and would be eligible for oxygen prescription.   (Arterial oxygen saturation at or below 88% for at least 5 minutes taken during sleep on stable outpatient)    Details about Medicare Group Criteria coverage can be found at http://www.cms.hhs.gov/manuals/downloads/

## 2022-06-21 ENCOUNTER — TELEPHONE (OUTPATIENT)
Dept: PULMONOLOGY | Facility: CLINIC | Age: 67
End: 2022-06-21
Payer: MEDICARE

## 2022-06-21 NOTE — TELEPHONE ENCOUNTER
Rashmi, patient needs orders to continue NC 2 L sleep. This week or next please schedule in clinic visit or telemed visit (patient must be in state of [place] for telemed).  Thank you        6/18/2022 Overnight Oxygen Saturation Study: abnormal.     INTERPRETATION:  Conditions of Test: Noted above in report     Interpretation of results of overnight oxygen saturation study.  This was a technically  adequate study.  Overnight oxygen saturation study is abnormal with O2 saturation less than 89%.   Time with SpO2<89: 0:31:12, 7.2% of the study period. Lowest oxygen saturation recorded was 84% .     Based on the above information patient meets criteria for oxygen prescription.  Clinical correlation suggested.  Norman Muñiz MD

## 2022-06-22 ENCOUNTER — TELEPHONE (OUTPATIENT)
Dept: PULMONOLOGY | Facility: CLINIC | Age: 67
End: 2022-06-22
Payer: MEDICARE

## 2022-06-28 ENCOUNTER — OFFICE VISIT (OUTPATIENT)
Dept: PULMONOLOGY | Facility: CLINIC | Age: 67
End: 2022-06-28
Payer: MEDICARE

## 2022-06-28 VITALS — HEIGHT: 61 IN | BODY MASS INDEX: 33.99 KG/M2 | WEIGHT: 180 LBS

## 2022-06-28 DIAGNOSIS — G47.61 PLMD (PERIODIC LIMB MOVEMENT DISORDER): ICD-10-CM

## 2022-06-28 DIAGNOSIS — E66.9 NOCTURNAL HYPOXEMIA DUE TO OBESITY: ICD-10-CM

## 2022-06-28 DIAGNOSIS — I10 HYPERTENSION, UNSPECIFIED TYPE: ICD-10-CM

## 2022-06-28 DIAGNOSIS — G47.36 NOCTURNAL HYPOXEMIA DUE TO OBESITY: ICD-10-CM

## 2022-06-28 DIAGNOSIS — G47.34 NOCTURNAL HYPOXEMIA: Primary | ICD-10-CM

## 2022-06-28 DIAGNOSIS — E66.2 CLASS 1 OBESITY WITH ALVEOLAR HYPOVENTILATION, SERIOUS COMORBIDITY, AND BODY MASS INDEX (BMI) OF 34.0 TO 34.9 IN ADULT: ICD-10-CM

## 2022-06-28 DIAGNOSIS — E66.2 CLASS 2 OBESITY WITH ALVEOLAR HYPOVENTILATION, SERIOUS COMORBIDITY, AND BODY MASS INDEX (BMI) OF 38.0 TO 38.9 IN ADULT: ICD-10-CM

## 2022-06-28 DIAGNOSIS — N18.30 STAGE 3 CHRONIC KIDNEY DISEASE, UNSPECIFIED WHETHER STAGE 3A OR 3B CKD: ICD-10-CM

## 2022-06-28 PROBLEM — E66.811 CLASS 1 OBESITY WITH ALVEOLAR HYPOVENTILATION, SERIOUS COMORBIDITY, AND BODY MASS INDEX (BMI) OF 34.0 TO 34.9 IN ADULT: Status: ACTIVE | Noted: 2021-07-06

## 2022-06-28 PROBLEM — E66.812 CLASS 2 OBESITY WITH ALVEOLAR HYPOVENTILATION, SERIOUS COMORBIDITY, AND BODY MASS INDEX (BMI) OF 38.0 TO 38.9 IN ADULT: Status: RESOLVED | Noted: 2021-04-16 | Resolved: 2022-06-28

## 2022-06-28 PROCEDURE — 99215 PR OFFICE/OUTPT VISIT, EST, LEVL V, 40-54 MIN: ICD-10-PCS | Mod: 95,,, | Performed by: NURSE PRACTITIONER

## 2022-06-28 PROCEDURE — 99215 OFFICE O/P EST HI 40 MIN: CPT | Mod: 95,,, | Performed by: NURSE PRACTITIONER

## 2022-06-28 NOTE — PROGRESS NOTES
The patient location is: home  The chief complaint leading to consultation is: nocturnal hypoxemia     Visit type: audiovisual    Face to Face time with patient: 4090 [id number]  49 minutes of total time spent on the encounter, which includes face to face time and non-face to face time preparing to see the patient (eg, review of tests), Obtaining and/or reviewing separately obtained history, Documenting clinical information in the electronic or other health record, Independently interpreting results (not separately reported) and communicating results to the patient/family/caregiver, or Care coordination (not separately reported).     Each patient to whom he or she provides medical services by telemedicine is:  (1) informed of the relationship between the physician and patient and the respective role of any other health care provider with respect to management of the patient; and (2) notified that he or she may decline to receive medical services by telemedicine and may withdraw from such care at any time.    Notes:      Subjective:      Patient ID: Leticia Trevino is a 67 y.o. female.    Patient Active Problem List   Diagnosis    HTN (hypertension)    History of colonic polyps    CKD (chronic kidney disease) stage 3, GFR 30-59 ml/min    Dyslipidemia    Snores    PLMD (periodic limb movement disorder)    Nocturnal hypoxemia due to obesity    Insomnia    Circadian rhythm disorder    Class 1 obesity with alveolar hypoventilation, serious comorbidity, and body mass index (BMI) of 34.0 to 34.9 in adult       she has been referred by No ref. provider found for evaluation and management for   Chief Complaint   Patient presents with    Hypoxia     Nocturnal          Chief Complaint: Hypoxia (Nocturnal /)      HPI:  She presents for telemedicine visit, she needed face to face visit to obtain home O2 NC 2 L for sleep.    She was initially evaluated for obstructive sleep apnea, no obstructive sleep apnea,  "nocturnal desats seen.     6/28/2021 PSG No Significant Obstructive Sleep apnea(ALIX): AHI was 3.9/hr ( 17 events). The cumulative time under 88% oxygen saturation was 26.3 minutes.  7/6/2021 orders NC 2lm sleep. Patient unable to be home to accept O2 set up before 30 days from order    8/8/2021 Overnight Oxygen Saturation Study on room air is abnormal with O2 saturation less than 88%.Time with SpO2<89: 0:56:48, 13.3%  of the study period. Lowest oxygen saturation recorded was 78%.    8/13/2021 orders NC 2 lm sleep needed face to face visit to obtain O2.   .  Sleep Apnea  Patient has been observed snoring with tossing/turning some nights.  Patient reports "restful sleep.  She denies morning headache.   She denies day time napping.  She denies recent weight gain.  Cardiovascular risk factors: hypertension and obesity  Bed time is 0900 - 0930 in bed with book. Then asleep 10pm.  Wake time is 0500 - 530  Sleep onset is within 30 Minutes.  Sleep maintenance difficulties related to typically none, some nights awakens and hard to go back to sleep  Wake after sleep onset occurs none to one time a night   Nocturia occurs none.   Sleep aids : No  Dry mouth : No  Sleep walking: No  Sleep talking : No  Sleep eating:No  Vivid Dreams : yes  Cataplexy : No    [place] Sleepiness Scale   EPWORTH SLEEPINESS SCALE 6/28/2022 6/23/2021   Sitting and reading 0 3   Watching TV 2 2   Sitting, inactive in a public place (e.g. a theatre or a meeting) 1 0   As a passenger in a car for an hour without a break 3 3   Lying down to rest in the afternoon when circumstances permit 2 3   Sitting and talking to someone 0 0   Sitting quietly after a lunch without alcohol 0 1   In a car, while stopped for a few minutes in traffic 0 0   Total score 8 12       Neck circumference is 15.  Mallampati score 3    STOP - BANG Questionnaire:   1. Snoring : Do you snore loudly ?     YES  2. Tired : Do you often feel tired, fatigued, or sleepy during " daytime?  NO  3. Observed: Has anyone observed you stop breathing during your sleep?    NO  4. Blood pressure : Do you have or are you being treated for high blood pressure?    YES  5. BMI :BMI more than 35 kg/m2?   NO  6. Age : Age over 50 yr old?    YES  7. Neck circumference: Neck circumference greater than 40 cm?   NO  8. Gender: Gender male?   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 Worked 9 weeks then off 3 weeks. 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    Previous Report Reviewed: lab reports and office notes     Past Medical History: The following portions of the patient's history were reviewed and updated as appropriate:   She  has a past surgical history that includes Appendectomy; Tonsillectomy; and Foot surgery (Left).  Her family history includes Alcohol abuse in her mother; Aneurysm in her mother; Breast cancer (age of onset: 65) in her sister; Cancer in her sister; Stroke in her father.  She  reports that she quit smoking about 23 years ago. Her smoking use included cigarettes. She started smoking about 52 years ago. She has a 14.50 pack-year smoking history. She has never used smokeless tobacco. She reports current alcohol use. She reports that she does not use drugs.  She has a current medication list which includes the following prescription(s): clotrimazole-betamethasone 1-0.05%, enalapril, ferrous fumarate/iron ps cplx, and triamcinolone acetonide 0.025%.  She is allergic to sulfa (sulfonamide antibiotics)..    Review of Systems   Constitutional: Negative for fever, chills, weight loss, weight gain, activity change, appetite change, fatigue and night sweats.   HENT: Negative for  "postnasal drip, rhinorrhea, sinus pressure, voice change and congestion.    Eyes: Negative for redness and itching.   Respiratory: Positive for snoring. Negative for cough, sputum production, chest tightness, shortness of breath, wheezing, orthopnea, asthma nighttime symptoms, dyspnea on extertion, use of rescue inhaler and somnolence.    Cardiovascular: Negative.  Negative for chest pain, palpitations and leg swelling.   Genitourinary: Negative for difficulty urinating and hematuria.   Endocrine: Negative for cold intolerance and heat intolerance.    Musculoskeletal: Negative for arthralgias, gait problem, joint swelling and myalgias.   Skin: Negative.    Gastrointestinal: Negative for nausea, vomiting, abdominal pain and acid reflux.   Neurological: Negative for dizziness, weakness, light-headedness and headaches.   Hematological: Negative for adenopathy. No excessive bruising.   All other systems reviewed and are negative.     Objective:   Ht 5' 1" (1.549 m)   Wt 81.6 kg (180 lb)   BMI 34.01 kg/m²   Physical Exam  Vitals and nursing note reviewed.   Constitutional:       General: She is not in acute distress.     Appearance: She is well-developed. She is not ill-appearing or toxic-appearing.   HENT:      Head: Normocephalic.      Right Ear: External ear normal.      Left Ear: External ear normal.      Nose: Nose normal.      Mouth/Throat:      Pharynx: No oropharyngeal exudate.   Eyes:      Conjunctiva/sclera: Conjunctivae normal.   Cardiovascular:      Rate and Rhythm: Normal rate and regular rhythm.      Heart sounds: Normal heart sounds.   Pulmonary:      Effort: Pulmonary effort is normal.      Breath sounds: Normal breath sounds. No stridor.   Abdominal:      Palpations: Abdomen is soft.   Musculoskeletal:         General: Normal range of motion.      Cervical back: Normal range of motion and neck supple.   Lymphadenopathy:      Cervical: No cervical adenopathy.   Skin:     General: Skin is warm and dry. "   Neurological:      Mental Status: She is alert and oriented to person, place, and time.   Psychiatric:         Behavior: Behavior normal. Behavior is cooperative.         Thought Content: Thought content normal.         Judgment: Judgment normal.       Personal Diagnostic Review  Review of labs, xray's, cardiology reports.     6/18/2022 Overnight Oxygen Saturation Study: abnormal      INTERPRETATION:  Conditions of Test: Noted above in report     Interpretation of results of overnight oxygen saturation study.  This was a technically  adequate study.  Overnight oxygen saturation study is abnormal with O2 saturation less than 89%.   Time with SpO2<89: 0:31:12, 7.2% of the study period. Lowest oxygen saturation recorded was 84% .     Based on the above information patient meets criteria for oxygen prescription.  Clinical correlation suggested.  Norman Muñiz MD     Medicare Criteria Comments:   Overnight Oximetry test results suggest the patient does fall under Medicare Group 1 Criteria and would be eligible for oxygen prescription.   (Arterial oxygen saturation at or below 88% for at least 5 minutes taken during sleep on stable     Assessment:     1. Nocturnal hypoxemia    2. Class 2 obesity with alveolar hypoventilation, serious comorbidity, and body mass index (BMI) of 38.0 to 38.9 in adult    3. Nocturnal hypoxemia due to obesity    4. PLMD (periodic limb movement disorder)    5. Stage 3 chronic kidney disease, unspecified whether stage 3a or 3b CKD    6. Hypertension, unspecified type    7. Class 1 obesity with alveolar hypoventilation, serious comorbidity, and body mass index (BMI) of 34.0 to 34.9 in adult      Orders Placed This Encounter   Procedures    OXYGEN FOR HOME USE     Patient is on home O2 for sleep, she needs continuation. Thank you     Order Specific Question:   Liter Flow     Answer:   2     Order Specific Question:   Duration     Answer:   With sleep     Order Specific Question:   Qualifying Test  "Performed at:     Answer:   [facility]     Comments:   6/18/2022 overnight oximetry on room air     Order Specific Question:   Oxygen saturation:     Answer:   84     Order Specific Question:   Portable mode:     Answer:   pulse dose acceptable     Comments:   NA      Order Specific Question:   Mode:     Answer:   Portable concentrator     Comments:   NA      Order Specific Question:   Route     Answer:   nasal cannula     Order Specific Question:   Device:     Answer:   home concentrator     Order Specific Question:   Length of need (in months):     Answer:   99 mos     Order Specific Question:   Patient condition with qualifying saturation     Answer:   Other - List qualifying diagnosis and code     Order Specific Question:   Select a diagnosis & list the code in the comments     Answer:   Obesity with alveolar hypoventilation [[id number]]     Order Specific Question:   Height:     Answer:   5' 1" (1.549 m)     Order Specific Question:   Weight:     Answer:   81.6 kg (180 lb)     Order Specific Question:   Alternative treatment measures have been tried or considered and deemed clinically ineffective.     Answer:   Yes    Home Sleep Studies     Standing Status:   Future     Standing Expiration Date:   6/28/2023     Scheduling Instructions:      2 night Home Sleep Study     Plan:   Discussed diagnosis, its evaluation, treatment and usual course. All questions answered.  Problem List Items Addressed This Visit     PLMD (periodic limb movement disorder)     Mild seen on 6/28/2021 PSG:The total leg movements were 18 with a resulting leg movement index of 4.1/hr .Associated arousal with leg movement index was 0.2/hr.   Not bothersome to patient  Ferritin level pending being drawn, request patient proceed.            Nocturnal hypoxemia due to obesity     6/18/2022 repeat overnight abnormal indication continue NC 2 L sleep.   She is willing to re-evaluate for obstructive sleep apnea with 2 night Home Sleep Study.  reeval magdiel " cause nocturnal hypoxemia.  If no obstructive sleep apnea consideration for cardiac evaluation further evaluate cause other than alveolar hypoventilation syndrome with obesity.              HTN (hypertension)     Stable and controlled. Continue current treatment plan as previously prescribed with your PCP.                RESOLVED: Class 2 obesity with alveolar hypoventilation, serious comorbidity, and body mass index (BMI) of 38.0 to 38.9 in adult    Class 1 obesity with alveolar hypoventilation, serious comorbidity, and body mass index (BMI) of 34.0 to 34.9 in adult     Despite 35 lb weight loss, patient continues with nocturnal hypoxemia    Continue to encourage exercise and dietary modification to obtain normal weight.   Wt Readings from Last 9 Encounters:   06/28/22 81.6 kg (180 lb)   05/13/22 81.8 kg (180 lb 5.4 oz)   05/06/22 81.8 kg (180 lb 5.4 oz)   08/24/21 93 kg (205 lb)   06/23/21 97.7 kg (215 lb 8 oz)   04/16/21 96.5 kg (212 lb 11.9 oz)   03/27/17 77.1 kg (169 lb 15.6 oz)   01/04/17 78.9 kg (174 lb)                Relevant Orders    Home Sleep Studies    OXYGEN FOR HOME USE    CKD (chronic kidney disease) stage 3, GFR 30-59 ml/min     Stable, managed by primary care provider               Other Visit Diagnoses     Nocturnal hypoxemia    -  Primary    Relevant Orders    Home Sleep Studies    OXYGEN FOR HOME USE        PLAN:   Patient has worked on weight loss. She requested repeat over night, she has travel planned to [place] with her daughter and concerned about travel with needing O2 for sleep.   6/18/2022 repeat overnight abnormal indication continue NC 2 L sleep.   She is willing to re-evaluate for obstructive sleep apnea with 2 night Home Sleep Study.  reeval magdiel cause nocturnal hypoxemia.  If no obstructive sleep apnea consideration for cardiac evaluation further evaluate cause other than alveolar hypoventilation syndrome with obesity.     She feels did not get good nights sleep in sleep lab when  performed PSG 6/28/2021 PSG No Significant Obstructive Sleep apnea(ALIX): AHI was 3.9/hr ( 17 events). The cumulative time under 88% oxygen saturation was 26.3 minutes.    Follow up for if ALIX, begin PAP therapy then fu IDL.

## 2022-06-29 ENCOUNTER — TELEPHONE (OUTPATIENT)
Dept: SLEEP MEDICINE | Facility: CLINIC | Age: 67
End: 2022-06-29
Payer: MEDICARE

## 2022-06-29 ENCOUNTER — PATIENT MESSAGE (OUTPATIENT)
Dept: PULMONOLOGY | Facility: CLINIC | Age: 67
End: 2022-06-29
Payer: MEDICARE

## 2022-06-29 NOTE — ASSESSMENT & PLAN NOTE
Despite 35 lb weight loss, patient continues with nocturnal hypoxemia    Continue to encourage exercise and dietary modification to obtain normal weight.   Wt Readings from Last 9 Encounters:   06/28/22 81.6 kg (180 lb)   05/13/22 81.8 kg (180 lb 5.4 oz)   05/06/22 81.8 kg (180 lb 5.4 oz)   08/24/21 93 kg (205 lb)   06/23/21 97.7 kg (215 lb 8 oz)   04/16/21 96.5 kg (212 lb 11.9 oz)   03/27/17 77.1 kg (169 lb 15.6 oz)   01/04/17 78.9 kg (174 lb)

## 2022-06-29 NOTE — ASSESSMENT & PLAN NOTE
Mild seen on 6/28/2021 PSG:The total leg movements were 18 with a resulting leg movement index of 4.1/hr .Associated arousal with leg movement index was 0.2/hr.   Not bothersome to patient  Ferritin level pending being drawn, request patient proceed.

## 2022-06-29 NOTE — ASSESSMENT & PLAN NOTE
6/18/2022 repeat overnight abnormal indication continue NC 2 L sleep.   She is willing to re-evaluate for obstructive sleep apnea with 2 night Home Sleep Study.  reeval magdiel cause nocturnal hypoxemia.  If no obstructive sleep apnea consideration for cardiac evaluation further evaluate cause other than alveolar hypoventilation syndrome with obesity.

## 2022-07-20 ENCOUNTER — PROCEDURE VISIT (OUTPATIENT)
Dept: SLEEP MEDICINE | Facility: CLINIC | Age: 67
End: 2022-07-20
Payer: MEDICARE

## 2022-07-20 DIAGNOSIS — E66.2 CLASS 1 OBESITY WITH ALVEOLAR HYPOVENTILATION, SERIOUS COMORBIDITY, AND BODY MASS INDEX (BMI) OF 34.0 TO 34.9 IN ADULT: ICD-10-CM

## 2022-07-20 DIAGNOSIS — G47.34 NOCTURNAL HYPOXEMIA: ICD-10-CM

## 2022-07-20 PROCEDURE — 95806 SLEEP STUDY UNATT&RESP EFFT: CPT | Mod: PBBFAC | Performed by: INTERNAL MEDICINE

## 2022-07-20 NOTE — Clinical Note
Based on the [employer] practice parameter of CPAP would be the guideline recommendation of choice. Other therapies may include ENT procedures were appropriate, significant weight loss. Inspire hypoglossal nerve stimulator and nasal PROVENT or mandibular advancement device may also be considered. Close follow up to ensure resolution of symptoms. 2 night study MILD/BORDERLINE OBSTRUCTIVE SLEEP APNEA with overall AHI 9.5/hr ( 53 events): night # 2 Oxygen desaturation: 84%. SpO2 between 85% to 89% for 3 min. Patient snored 98% time above 50 . Heart rate range: 44 bpm - 74 bpm REC's: Therapy with APAP at 4-20 cm WP using mask of choice with heated humidification is an option. Weight loss/management. with regular exercise per direction of physician. Avoid drowsy driving. Follow up in sleep clinic to maximize adherence and ensure resolution of symptoms.

## 2022-07-25 ENCOUNTER — PATIENT MESSAGE (OUTPATIENT)
Dept: PULMONOLOGY | Facility: CLINIC | Age: 67
End: 2022-07-25
Payer: MEDICARE

## 2022-07-25 DIAGNOSIS — G47.33 OBSTRUCTIVE SLEEP APNEA: Primary | ICD-10-CM

## 2022-07-25 PROCEDURE — 95806 SLEEP STUDY UNATT&RESP EFFT: CPT | Mod: 26,S$PBB,, | Performed by: INTERNAL MEDICINE

## 2022-07-25 PROCEDURE — 95806 PR SLEEP STUDY, UNATTENDED, SIMUL RECORD HR/O2 SAT/RESP FLOW/RESP EFFT: ICD-10-PCS | Mod: 26,S$PBB,, | Performed by: INTERNAL MEDICINE

## 2022-07-25 NOTE — PROCEDURES
Home Sleep Studies    Date/Time: 7/20/2022 8:00 AM  Performed by: Raymond Sotelo MD  Authorized by: Ginette Hernandez NP       Based on the American academy [employer] practice parameter of CPAP would be the guideline  recommendation of choice.  Other therapies may include ENT procedures were appropriate, significant weight loss.  Inspire hypoglossal nerve stimulator and nasal PROVENT or mandibular advancement device may also  be  considered.  Close follow up to ensure resolution of symptoms.  2 night study  MILD/BORDERLINE OBSTRUCTIVE SLEEP APNEA with overall AHI 9.5/hr ( 53 events): night # 2  Oxygen desaturation: 84%. SpO2 between 85% to 89% for 3 min.  Patient snored 98% time above 50 .  Heart rate range: 44 bpm - 74 bpm  REC's:  Therapy with APAP at 4-20 cm WP using mask of choice with heated humidification is an option.  Weight loss/management. with regular exercise per direction of physician.  Avoid drowsy driving.  Follow up in sleep clinic to maximize adherence and ensure resolution of symptoms.

## 2022-07-28 NOTE — TELEPHONE ENCOUNTER
Telephoned patient to get questions answered. No answer. Left message.     Orders Placed This Encounter   Procedures    CPAP FOR HOME USE     07/18/2022, 07/19/2022 Home Sleep Study    2 night study  MILD/BORDERLINE OBSTRUCTIVE SLEEP APNEA with overall AHI 9.5/hr ( 53 events): night # 2  Oxygen desaturation: 84%. SpO2 between 85% to 89% for 3 min.  Patient snored 98% time above 50 .  Heart rate range: 44 bpm - 74 bpm    Patient would like to obtain CPAP machine before she leaves [facility] on Friday 8/5/2022. Thank you     Order Specific Question:   Length of need (1-99 months):     Answer:   99     Order Specific Question:   Type ():     Answer:   Auto CPAP     Order Specific Question:   Auto CPAP pressure setting range (cmH20):     Answer:   4-20     Order Specific Question:   Fulfillment Priority:     Answer:   Level 4:  all others     Order Specific Question:   Humidification ():     Answer:   Heated     Order Specific Question:   Choose ONE mask type and its corresponding cushions and/or pillows:     Answer:    Nasal Mask, 1 per 90 days:  Nasal Cushions, (6 per 90 days):  Nasal Pillows, (6 per 90 days)     Comments:   or mask of choice      Order Specific Question:   Choose EITHER Heated or Non-Heated Tubjing     Answer:    Non-Heated Tubing, 1 per 90 days     Order Specific Question:   Number of Days Needed:     Answer:   99     Order Specific Question:   All other supplies as needed as listed below:     Answer:    Headgear, 1 per 180 days     Order Specific Question:   All other supplies as needed as listed below:     Answer:    Chin Strap, 1 per 180 days     Order Specific Question:   All other supplies as needed as listed below:     Answer:    Disposable Filter, 6 per 90 days     Order Specific Question:   All other supplies as needed as listed below:     Answer:    Non-Disposable Filter, 1 per 180 days     Order Specific Question:   All other supplies as  needed as listed below:     Answer:    Humidifier Chamber, 1 per 180 days     1. Obstructive sleep apnea  CPAP FOR HOME USE     Follow up 31-90 days from obtaining CPAP for ILD.

## 2022-08-02 ENCOUNTER — PATIENT MESSAGE (OUTPATIENT)
Dept: PULMONOLOGY | Facility: CLINIC | Age: 67
End: 2022-08-02
Payer: MEDICARE

## 2022-08-11 RX ORDER — ENALAPRIL MALEATE 2.5 MG/1
TABLET ORAL
Qty: 90 TABLET | Refills: 3 | Status: SHIPPED | OUTPATIENT
Start: 2022-08-11 | End: 2023-09-18

## 2022-08-11 NOTE — TELEPHONE ENCOUNTER
Refill Decision Note   Leticia Trevino  is requesting a refill authorization.  Brief Assessment and Rationale for Refill:  Approve     Medication Therapy Plan:       Medication Reconciliation Completed: No   Comments:     No Care Gaps recommended.     Note composed:11:56 AM 08/11/2022

## 2022-08-11 NOTE — TELEPHONE ENCOUNTER
No new care gaps identified.  [facility] Embedded Care Gaps. Reference number: [id number]. 8/11/2022   11:44:29 AM MORIAHT

## 2022-08-24 ENCOUNTER — PES CALL (OUTPATIENT)
Dept: ADMINISTRATIVE | Facility: OTHER | Age: 67
End: 2022-08-24
Payer: MEDICARE

## 2022-10-21 ENCOUNTER — PATIENT MESSAGE (OUTPATIENT)
Dept: PULMONOLOGY | Facility: CLINIC | Age: 67
End: 2022-10-21
Payer: MEDICARE

## 2022-11-08 ENCOUNTER — TELEPHONE (OUTPATIENT)
Dept: PRIMARY CARE CLINIC | Facility: CLINIC | Age: 67
End: 2022-11-08
Payer: MEDICARE

## 2022-11-08 NOTE — TELEPHONE ENCOUNTER
left voicemail to inform patient that her appointment date and time has been changed due to the provider being unavailable, left patient new appointment date and time which is 12/14/22@11:00am

## 2022-11-17 ENCOUNTER — PATIENT MESSAGE (OUTPATIENT)
Dept: PULMONOLOGY | Facility: CLINIC | Age: 67
End: 2022-11-17
Payer: MEDICARE

## 2022-11-21 ENCOUNTER — OFFICE VISIT (OUTPATIENT)
Dept: PULMONOLOGY | Facility: CLINIC | Age: 67
End: 2022-11-21
Payer: MEDICARE

## 2022-11-21 ENCOUNTER — CLINICAL SUPPORT (OUTPATIENT)
Dept: PULMONOLOGY | Facility: CLINIC | Age: 67
End: 2022-11-21
Payer: MEDICARE

## 2022-11-21 VITALS
HEIGHT: 61 IN | SYSTOLIC BLOOD PRESSURE: 138 MMHG | BODY MASS INDEX: 33.99 KG/M2 | RESPIRATION RATE: 20 BRPM | HEART RATE: 67 BPM | OXYGEN SATURATION: 98 % | DIASTOLIC BLOOD PRESSURE: 86 MMHG | WEIGHT: 180 LBS

## 2022-11-21 DIAGNOSIS — N18.30 STAGE 3 CHRONIC KIDNEY DISEASE, UNSPECIFIED WHETHER STAGE 3A OR 3B CKD: ICD-10-CM

## 2022-11-21 DIAGNOSIS — E66.2 CLASS 1 OBESITY WITH ALVEOLAR HYPOVENTILATION, SERIOUS COMORBIDITY, AND BODY MASS INDEX (BMI) OF 34.0 TO 34.9 IN ADULT: ICD-10-CM

## 2022-11-21 DIAGNOSIS — G47.33 OSA ON CPAP: Primary | ICD-10-CM

## 2022-11-21 DIAGNOSIS — G47.61 PLMD (PERIODIC LIMB MOVEMENT DISORDER): ICD-10-CM

## 2022-11-21 DIAGNOSIS — I10 HYPERTENSION, UNSPECIFIED TYPE: ICD-10-CM

## 2022-11-21 DIAGNOSIS — G47.36 NOCTURNAL HYPOXEMIA DUE TO OBESITY: ICD-10-CM

## 2022-11-21 DIAGNOSIS — G47.33 OSA ON CPAP: ICD-10-CM

## 2022-11-21 DIAGNOSIS — E66.9 NOCTURNAL HYPOXEMIA DUE TO OBESITY: ICD-10-CM

## 2022-11-21 PROCEDURE — 94762 N-INVAS EAR/PLS OXIMTRY CONT: CPT | Mod: PBBFAC

## 2022-11-21 PROCEDURE — 99214 PR OFFICE/OUTPT VISIT, EST, LEVL IV, 30-39 MIN: ICD-10-PCS | Mod: S$PBB,,, | Performed by: NURSE PRACTITIONER

## 2022-11-21 PROCEDURE — 99999 PR PBB SHADOW E&M-EST. PATIENT-LVL IV: ICD-10-PCS | Mod: PBBFAC,,, | Performed by: NURSE PRACTITIONER

## 2022-11-21 PROCEDURE — 99999 PR PBB SHADOW E&M-EST. PATIENT-LVL IV: CPT | Mod: PBBFAC,,, | Performed by: NURSE PRACTITIONER

## 2022-11-21 PROCEDURE — 99214 OFFICE O/P EST MOD 30 MIN: CPT | Mod: S$PBB,,, | Performed by: NURSE PRACTITIONER

## 2022-11-21 PROCEDURE — 99214 OFFICE O/P EST MOD 30 MIN: CPT | Mod: PBBFAC,25 | Performed by: NURSE PRACTITIONER

## 2022-11-21 NOTE — ASSESSMENT & PLAN NOTE
Benefits and compliant with Auto CPAP 4-20 cm with optimal control AHI 0.5.   Nasal wisp mask  HME: [facility]   Continue APAP 4-20 cm  Updated supply order nasal mask  11/21/2022 Overnight oximetry on room air on APAP 4-20 cm results pending  Follow up 6 month compliance download

## 2022-11-21 NOTE — PROGRESS NOTES
Subjective:      Patient ID: Leticia Trevino is a 67 y.o. female.    Patient Active Problem List   Diagnosis    HTN (hypertension)    History of colonic polyps    CKD (chronic kidney disease) stage 3, GFR 30-59 ml/min    Dyslipidemia    Snores    PLMD (periodic limb movement disorder)    Nocturnal hypoxemia due to obesity    Insomnia    Circadian rhythm disorder    Class 1 obesity with alveolar hypoventilation, serious comorbidity, and body mass index (BMI) of 34.0 to 34.9 in adult    ALIX on CPAP       she has been referred by No ref. provider found for evaluation and management for   Chief Complaint   Patient presents with    Sleep Apnea       Chief Complaint: Sleep Apnea      HPI:    HPI: Leticia Trevino is here for follow up for ALIX and CPAP complaince assessment.   She is on Auto CPAP of 4-20 cmH2O pressure which is optimally controlling sleep apnea with apneic index (AHI) 0.5 events an hour.   She is compliant with CPAP use. Complaince download today reveals 86% of days with greater than 4 hours of device use.   Patient reports benefit from CPAP use and denies snoring and excessive daytime sleepiness.  Patient reports no complaints. Nasal wisp mask used.     08/03/2022 obtained Resmed CPAP machine    07/18/2022, 07/19/2022 Home Sleep Study    2 night study  MILD/BORDERLINE OBSTRUCTIVE SLEEP APNEA with overall AHI 9.5/hr ( 53 events): night # 2  Oxygen desaturation: 84%. SpO2 between 85% to 89% for 3 min.  Patient snored 98% time above 50 .  Heart rate range: 44 bpm - 74 bpm    Usage 08/23/2022 - 11/20/2022  Usage days 78/90 days (87%)  >= 4 hours 77 days (86%)  < 4 hours 1 days (1%)  Usage hours 575 hours 53 minutes  Average usage (total days) 6 hours 24 minutes  Average usage (days used) 7 hours 23 minutes  Median usage (days used) 7 hours 23 minutes  AirSense 10 AutoSet  Serial number [id number]  Mode AutoSet  Min Pressure 4 cmH2O  Max Pressure 20 cmH2O  EPR Fulltime  EPR level 3  Response  Standard  Therapy  Pressure - cmH2O Median: 7.2 95th percentile: 10.9 Maximum: 12.5  Leaks - L/min Median: 2.6 95th percentile: 11.6 Maximum: 28.0  Events per hour AI: 0.3 HI: 0.2 AHI: 0.5  Apnea Index Central: 0.1 Obstructive: 0.2 Unknown: 0.0  RERA Index 0.1    Montara Sleepiness Scale   EPWORTH SLEEPINESS SCALE 11/21/2022 6/28/2022 6/23/2021   Sitting and reading 0 0 3   Watching TV 1 2 2   Sitting, inactive in a public place (e.g. a theatre or a meeting) 1 1 0   As a passenger in a car for an hour without a break 3 3 3   Lying down to rest in the afternoon when circumstances permit 3 2 3   Sitting and talking to someone 0 0 0   Sitting quietly after a lunch without alcohol 0 0 1   In a car, while stopped for a few minutes in traffic 0 0 0   Total score 8 8 12        Prior history  She was initially evaluated for obstructive sleep apnea, no obstructive sleep apnea, nocturnal desats seen.   6/28/2021 PSG No Significant Obstructive Sleep apnea(ALIX): AHI was 3.9/hr ( 17 events). The cumulative time under 88% oxygen saturation was 26.3 minutes.  7/6/2021 orders NC 2lm sleep. Patient unable to be home to accept O2 set up before 30 days from order  8/8/2021 Overnight Oxygen Saturation Study on room air is abnormal with O2 saturation less than 88%.Time with SpO2<89: 0:56:48, 13.3%  of the study period. Lowest oxygen saturation recorded was 78%.  8/13/2021 orders NC 2 lm sleep needed face to face visit to obtain O2.     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 Worked 9 weeks then off 3 weeks. 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    Previous Report Reviewed: lab reports and office notes     Past Medical History: The following portions of the patient's history were reviewed and updated as appropriate:   She  has a  past surgical history that includes Appendectomy; Tonsillectomy; and Foot surgery (Left).  Her family history includes Alcohol abuse in her mother; Aneurysm in her mother; Breast cancer (age of onset: 65) in her sister; Cancer in her sister; Stroke in her father.  She  reports that she quit smoking about 23 years ago. Her smoking use included cigarettes. She started smoking about 52 years ago. She has a 14.50 pack-year smoking history. She has never used smokeless tobacco. She reports current alcohol use. She reports that she does not use drugs.  She has a current medication list which includes the following prescription(s): clotrimazole-betamethasone 1-0.05%, enalapril, ferrous fumarate/iron ps cplx, and triamcinolone acetonide 0.025%.  She is allergic to sulfa (sulfonamide antibiotics)..    Review of Systems   Constitutional:  Negative for fever, chills, weight loss, weight gain, activity change, appetite change, fatigue and night sweats.   HENT:  Negative for postnasal drip, rhinorrhea, sinus pressure, voice change and congestion.    Eyes:  Negative for redness and itching.   Respiratory:  Positive for snoring. Negative for cough, sputum production, chest tightness, shortness of breath, wheezing, orthopnea, asthma nighttime symptoms, dyspnea on extertion, use of rescue inhaler and somnolence.    Cardiovascular: Negative.  Negative for chest pain, palpitations and leg swelling.   Genitourinary:  Negative for difficulty urinating and hematuria.   Endocrine:  Negative for cold intolerance and heat intolerance.    Musculoskeletal:  Negative for arthralgias, gait problem, joint swelling and myalgias.   Skin: Negative.    Gastrointestinal:  Negative for nausea, vomiting, abdominal pain and acid reflux.   Neurological:  Negative for dizziness, weakness, light-headedness and headaches.   Hematological:  Negative for adenopathy. No excessive bruising.   All other systems reviewed and are negative.   Objective:   /86  "  Pulse 67   Resp 20   Ht 5' 1" (1.549 m)   Wt 81.6 kg (180 lb) Comment: stated, pt request no weight today  SpO2 98%   BMI 34.01 kg/m²   Physical Exam  Vitals and nursing note reviewed.   Constitutional:       General: She is not in acute distress.     Appearance: She is well-developed. She is not ill-appearing or toxic-appearing.   HENT:      Head: Normocephalic.      Right Ear: External ear normal.      Left Ear: External ear normal.      Nose: Nose normal.      Mouth/Throat:      Pharynx: No oropharyngeal exudate.   Eyes:      Conjunctiva/sclera: Conjunctivae normal.   Cardiovascular:      Rate and Rhythm: Normal rate and regular rhythm.      Heart sounds: Normal heart sounds.   Pulmonary:      Effort: Pulmonary effort is normal.      Breath sounds: Normal breath sounds. No stridor.   Abdominal:      Palpations: Abdomen is soft.   Musculoskeletal:         General: Normal range of motion.      Cervical back: Normal range of motion and neck supple.   Lymphadenopathy:      Cervical: No cervical adenopathy.   Skin:     General: Skin is warm and dry.   Neurological:      Mental Status: She is alert and oriented to person, place, and time.   Psychiatric:         Behavior: Behavior normal. Behavior is cooperative.         Thought Content: Thought content normal.         Judgment: Judgment normal.     Personal Diagnostic Review  Review of labs, xray's, cardiology reports.     6/18/2022 Overnight Oxygen Saturation Study: abnormal      INTERPRETATION:  Conditions of Test: Noted above in report     Interpretation of results of overnight oxygen saturation study.  This was a technically  adequate study.  Overnight oxygen saturation study is abnormal with O2 saturation less than 89%.   Time with SpO2<89: 0:31:12, 7.2% of the study period. Lowest oxygen saturation recorded was 84% .     Based on the above information patient meets criteria for oxygen prescription.  Clinical correlation suggested.  Norman Muñiz MD   "   Medicare Criteria Comments:   Overnight Oximetry test results suggest the patient does fall under Medicare Group 1 Criteria and would be eligible for oxygen prescription.   (Arterial oxygen saturation at or below 88% for at least 5 minutes taken during sleep on stable     Assessment:     1. ALIX on CPAP    2. Nocturnal hypoxemia due to obesity    3. Class 1 obesity with alveolar hypoventilation, serious comorbidity, and body mass index (BMI) of 34.0 to 34.9 in adult    4. Hypertension, unspecified type    5. Stage 3 chronic kidney disease, unspecified whether stage 3a or 3b CKD    6. PLMD (periodic limb movement disorder)        Orders Placed This Encounter   Procedures    CPAP/BIPAP SUPPLIES     Benefits and compliant  90 day supply. 4 refills  HME: [facility]     Order Specific Question:   Length of need (1-99 months):     Answer:   99     Order Specific Question:   Choose ONE mask type and its corresponding cushions and/or pillows:     Answer:    Nasal Mask, 1 per 90 days:  Nasal Cushions, (6 per 90 days):  Nasal Pillows, (6 per 90 days)     Order Specific Question:   Choose EITHER Heated or Non-Heated Tubjing     Answer:    Non-Heated Tubing, 1 per 90 days     Order Specific Question:   Number of Days Needed:     Answer:   99     Order Specific Question:   All other supplies as needed as listed below:     Answer:    Headgear, 1 per 180 days     Order Specific Question:   All other supplies as needed as listed below:     Answer:    Chin Strap, 1 per 180 days     Order Specific Question:   All other supplies as needed as listed below:     Answer:    Disposable Filter, 6 per 90 days     Order Specific Question:   All other supplies as needed as listed below:     Answer:    Humidifier Chamber, 1 per 180 days     Order Specific Question:   All other supplies as needed as listed below:     Answer:    Non-Disposable Filter, 1 per 180 days    PULSE OXIMETRY OVERNIGHT      Standing Status:   Future     Number of Occurrences:   1     Standing Expiration Date:   11/21/2023     Order Specific Question:   Reason for Test?     Answer:   Sleep Apnea Assessment     Order Specific Question:   Symptoms:     Answer:   Other     Order Specific Question:   Oxygen Settings:     Answer:   NA     Order Specific Question:   BiPAP Settings:     Answer:   NA     Order Specific Question:   CPAP Settings:     Answer:   Auto CPAP 4-20     Order Specific Question:   Room Air:     Answer:   Yes       Plan:   Discussed diagnosis, its evaluation, treatment and usual course. All questions answered.  Problem List Items Addressed This Visit       PLMD (periodic limb movement disorder)     Mild seen on 6/28/2021 PSG:The total leg movements were 18 with a resulting leg movement index of 4.1/hr .Associated arousal with leg movement index was 0.2/hr.   Not bothersome to patient           ALIX on CPAP - Primary     Benefits and compliant with Auto CPAP 4-20 cm with optimal control AHI 0.5.   Nasal wisp mask  HME: [facility]   Continue APAP 4-20 cm  Updated supply order nasal mask  11/21/2022 Overnight oximetry on room air on APAP 4-20 cm results pending  Follow up 6 month compliance download          Relevant Orders    PULSE OXIMETRY OVERNIGHT    CPAP/BIPAP SUPPLIES    Nocturnal hypoxemia due to obesity     Off O2 since on Auto CPAP 4-20 cm   Proceed with Overnight oximetry on APAP 4-20 on room air   Plan d/c O2 for sleep if normal          Relevant Orders    PULSE OXIMETRY OVERNIGHT    HTN (hypertension)     Stable and controlled. Continue current treatment plan as previously prescribed with your PCP.              Class 1 obesity with alveolar hypoventilation, serious comorbidity, and body mass index (BMI) of 34.0 to 34.9 in adult     Continue to encourage exercise and dietary modification to obtain normal weight. Patient weight is stated, she opts not to weigh in clinic.   Wt Readings from Last 9 Encounters:   11/21/22  81.6 kg (180 lb)   06/28/22 81.6 kg (180 lb)   05/13/22 81.8 kg (180 lb 5.4 oz)   05/06/22 81.8 kg (180 lb 5.4 oz)   08/24/21 93 kg (205 lb)   06/23/21 97.7 kg (215 lb 8 oz)   04/16/21 96.5 kg (212 lb 11.9 oz)   03/27/17 77.1 kg (169 lb 15.6 oz)   01/04/17 78.9 kg (174 lb)              CKD (chronic kidney disease) stage 3, GFR 30-59 ml/min     Stable, managed by primary care provider              Follow up in about 6 months (around 5/21/2023) for CPAP 6 mo compliance download.

## 2022-11-21 NOTE — ASSESSMENT & PLAN NOTE
Off O2 since on Auto CPAP 4-20 cm   Proceed with Overnight oximetry on APAP 4-20 on room air   Plan d/c O2 for sleep if normal

## 2022-11-21 NOTE — ASSESSMENT & PLAN NOTE
Continue to encourage exercise and dietary modification to obtain normal weight. Patient weight is stated, she opts not to weigh in clinic.   Wt Readings from Last 9 Encounters:   11/21/22 81.6 kg (180 lb)   06/28/22 81.6 kg (180 lb)   05/13/22 81.8 kg (180 lb 5.4 oz)   05/06/22 81.8 kg (180 lb 5.4 oz)   08/24/21 93 kg (205 lb)   06/23/21 97.7 kg (215 lb 8 oz)   04/16/21 96.5 kg (212 lb 11.9 oz)   03/27/17 77.1 kg (169 lb 15.6 oz)   01/04/17 78.9 kg (174 lb)

## 2022-11-21 NOTE — ASSESSMENT & PLAN NOTE
Mild seen on 6/28/2021 PSG:The total leg movements were 18 with a resulting leg movement index of 4.1/hr .Associated arousal with leg movement index was 0.2/hr.   Not bothersome to patient

## 2022-11-22 NOTE — PROCEDURES
[id number] * [place] [address]  Telephone: [phone]  Test date: 22 Start: 22 20:38:39 Leticia Trevino  Doctor: ARIANA Hernandez End: 22 02:48:43 [id number]  Oximetry: Summary Report  Comments: room air and Auto CPAP  Recording time: 06:10:04 Highest pulse: 86 Highest SpO2: 98%  Excluded samplin:00:36 Lowest pulse: 48 Lowest SpO2: 86%  Total valid samplin:09:28 Mean pulse: 57 Mean SpO2: 93.0%  1 S.D.: 5.3 1 S.D.: 1.4  Time with SpO2<90: 0:06:20, 1.7%  Time with SpO2<80: 0:00:00, 0.0%  Time with SpO2<70: 0:00:00, 0.0%  Time with SpO2<60: 0:00:00, 0.0%  Time with SpO2<89: 0:02:40, 0.7%  Time with SpO2 =>90: 6:03:08, 98.3%  Time with SpO2=>80 & <90: 0:06:20, 1.7%  Time with SpO2=>70 & <80: 0:00:00, 0.0%  Time with SpO2=>60 & <70: 0:00:00, 0.0%  The longest continuous time with saturation <=88 was 00:00:36, which started at  22 00:39:23.  A desaturation event was defined as a decrease of saturation by 4 or more.  No events were excluded due to artifact.  There were 8 desaturation events over 3 minutes duration.  There were 7 desaturation events of less than 3 minutes duration during which:  The mean high was 94.6%. The mean low was 89.6%.  The number of these events that were:  > 0 & <10 seconds: 0 > 0 seconds: 7  =>10 & <20 seconds: 2 =>10 seconds: 7  =>20 & <30 seconds: 0 =>20 seconds: 5  =>30 & <40 seconds: 0 =>30 seconds: 5  =>40 & <50 seconds: 2 =>40 seconds: 5  =>50 & <60 seconds: 0 =>50 seconds: 3  =>60 seconds: 3 =>60 seconds: 3  The mean length of desaturation events that were >=10 sec & <=3 mins was: 61.1 sec.  Desaturation event index (events >=10 sec per sampled hour): 1.1  Desaturation event index (events >= 0 sec per sampled hour): 1.1

## 2022-11-29 ENCOUNTER — TELEPHONE (OUTPATIENT)
Dept: PULMONOLOGY | Facility: CLINIC | Age: 67
End: 2022-11-29
Payer: MEDICARE

## 2022-11-29 DIAGNOSIS — E66.9 NOCTURNAL HYPOXEMIA DUE TO OBESITY: ICD-10-CM

## 2022-11-29 DIAGNOSIS — G47.33 OSA ON CPAP: Primary | ICD-10-CM

## 2022-11-29 DIAGNOSIS — G47.36 NOCTURNAL HYPOXEMIA DUE TO OBESITY: ICD-10-CM

## 2022-11-29 NOTE — TELEPHONE ENCOUNTER
"Telephoned advised, order d/c home O2 for sleep 11/21/2022 Overnight Oxygen Saturation Study on room air on CPAP is normal no home O2 needed for sleep since on CPAP.   Continue CPAP nightly      Interpretation:  Conditions of testing: Stable outpatient. Room air.  Overnight oxygen saturuation study revealed transient hypoxemia (oxygen saturation less than 89%). Time with SpO2<89: 0:02:40, 0.7% of study period. Lowest oxygen saturation recorded as 86% .  Clinicial correlation suggested.     Evelia Carmona M.D         Medicare Criteria Comments:   Oximetry test results suggest the patient does not fall under Medicare Group 1 Criteria for oxygen prescription.   (Arterial oxygen saturation at or below 88% for at least 5 minutes taken during sleep)     Details about Medicare Group Criteria coverage can be found at http://www.cms.hhs.gov/manuals/downloads/     Orders Placed This Encounter   Procedures    HME - OTHER     Discontinued home oxygen. Thank you     Order Specific Question:   Type of Equipment:     Answer:   home oxygen concentrator     Order Specific Question:   Height:     Answer:   5' 1"     Order Specific Question:   Weight:     Answer:   180 lbs     1. ALIX on CPAP  HME - OTHER          "

## 2022-11-29 NOTE — ASSESSMENT & PLAN NOTE
11/21/2022 Overnight Oxygen Saturation Study on room air on CPAP is normal, no home O2 needed for sleep since on CPAP.   11/28/2022 D/C home O2, Continue CPAP nightly

## 2022-11-29 NOTE — PROCEDURES
[address] * [place] [address]  Telephone: [phone]  Test date: 22 Start: 22 20:38:39 Leticia Trevino  Doctor: ARIANA Hernandez End: 22 02:48:43 [id number]  Oximetry: Summary Report  Comments: room air and Auto CPAP  Recording time: 06:10:04 Highest pulse: 86 Highest SpO2: 98%  Excluded samplin:00:36 Lowest pulse: 48 Lowest SpO2: 86%  Total valid samplin:09:28 Mean pulse: 57 Mean SpO2: 93.0%  1 S.D.: 5.3 1 S.D.: 1.4  Time with SpO2<90: 0:06:20, 1.7%  Time with SpO2<80: 0:00:00, 0.0%  Time with SpO2<70: 0:00:00, 0.0%  Time with SpO2<60: 0:00:00, 0.0%  Time with SpO2<89: 0:02:40, 0.7%  Time with SpO2 =>90: 6:03:08, 98.3%  Time with SpO2=>80 & <90: 0:06:20, 1.7%  Time with SpO2=>70 & <80: 0:00:00, 0.0%  Time with SpO2=>60 & <70: 0:00:00, 0.0%  The longest continuous time with saturation <=88 was 00:00:36, which started at  22 00:39:23.  A desaturation event was defined as a decrease of saturation by 4 or more.  No events were excluded due to artifact.  There were 8 desaturation events over 3 minutes duration.  There were 7 desaturation events of less than 3 minutes duration during which:  The mean high was 94.6%. The mean low was 89.6%.  The number of these events that were:  > 0 & <10 seconds: 0 > 0 seconds: 7  =>10 & <20 seconds: 2 =>10 seconds: 7  =>20 & <30 seconds: 0 =>20 seconds: 5  =>30 & <40 seconds: 0 =>30 seconds: 5  =>40 & <50 seconds: 2 =>40 seconds: 5  =>50 & <60 seconds: 0 =>50 seconds: 3  =>60 seconds: 3 =>60 seconds: 3  The mean length of desaturation events that were >=10 sec & <=3 mins was: 61.1 sec.  Desaturation event index (events >=10 sec per sampled hour): 1.1  Desaturation event index (events >= 0 sec per sampled hour): 1.1

## 2022-12-02 ENCOUNTER — TELEPHONE (OUTPATIENT)
Dept: PRIMARY CARE CLINIC | Facility: CLINIC | Age: 67
End: 2022-12-02

## 2022-12-02 ENCOUNTER — OFFICE VISIT (OUTPATIENT)
Dept: PRIMARY CARE CLINIC | Facility: CLINIC | Age: 67
End: 2022-12-02
Payer: MEDICARE

## 2022-12-02 VITALS
HEART RATE: 61 BPM | OXYGEN SATURATION: 97 % | HEIGHT: 61 IN | DIASTOLIC BLOOD PRESSURE: 64 MMHG | BODY MASS INDEX: 34.56 KG/M2 | TEMPERATURE: 98 F | SYSTOLIC BLOOD PRESSURE: 130 MMHG | WEIGHT: 183.06 LBS

## 2022-12-02 DIAGNOSIS — M41.9 SCOLIOSIS, UNSPECIFIED SCOLIOSIS TYPE, UNSPECIFIED SPINAL REGION: ICD-10-CM

## 2022-12-02 DIAGNOSIS — I10 PRIMARY HYPERTENSION: ICD-10-CM

## 2022-12-02 DIAGNOSIS — F51.01 PRIMARY INSOMNIA: ICD-10-CM

## 2022-12-02 DIAGNOSIS — R53.83 FATIGUE, UNSPECIFIED TYPE: ICD-10-CM

## 2022-12-02 DIAGNOSIS — N18.30 STAGE 3 CHRONIC KIDNEY DISEASE, UNSPECIFIED WHETHER STAGE 3A OR 3B CKD: ICD-10-CM

## 2022-12-02 DIAGNOSIS — R79.89 POSITIVE D DIMER: ICD-10-CM

## 2022-12-02 DIAGNOSIS — G47.20 CIRCADIAN RHYTHM DISORDER: ICD-10-CM

## 2022-12-02 DIAGNOSIS — G47.33 OSA ON CPAP: ICD-10-CM

## 2022-12-02 DIAGNOSIS — Z76.89 ENCOUNTER TO ESTABLISH CARE: Primary | ICD-10-CM

## 2022-12-02 DIAGNOSIS — R52 BODY ACHES: ICD-10-CM

## 2022-12-02 DIAGNOSIS — E78.2 MIXED HYPERLIPIDEMIA: ICD-10-CM

## 2022-12-02 DIAGNOSIS — E66.2 CLASS 1 OBESITY WITH ALVEOLAR HYPOVENTILATION, SERIOUS COMORBIDITY, AND BODY MASS INDEX (BMI) OF 34.0 TO 34.9 IN ADULT: ICD-10-CM

## 2022-12-02 DIAGNOSIS — E78.5 DYSLIPIDEMIA: ICD-10-CM

## 2022-12-02 PROCEDURE — 99999 PR PBB SHADOW E&M-EST. PATIENT-LVL III: ICD-10-PCS | Mod: PBBFAC,,, | Performed by: INTERNAL MEDICINE

## 2022-12-02 PROCEDURE — 99205 PR OFFICE/OUTPT VISIT, NEW, LEVL V, 60-74 MIN: ICD-10-PCS | Mod: S$PBB,,, | Performed by: INTERNAL MEDICINE

## 2022-12-02 PROCEDURE — 99999 PR PBB SHADOW E&M-EST. PATIENT-LVL III: CPT | Mod: PBBFAC,,, | Performed by: INTERNAL MEDICINE

## 2022-12-02 PROCEDURE — 99205 OFFICE O/P NEW HI 60 MIN: CPT | Mod: S$PBB,,, | Performed by: INTERNAL MEDICINE

## 2022-12-02 PROCEDURE — 99213 OFFICE O/P EST LOW 20 MIN: CPT | Mod: PBBFAC,PN | Performed by: INTERNAL MEDICINE

## 2022-12-02 NOTE — PROGRESS NOTES
[facility] INITIAL VISIT NOTE      CHIEF COMPLAINT     Chief Complaint   Patient presents with    Scoliosis     Went to [facility] ER on 11/10/22 for pneumonia and scoliosis was noted on XR chest. Discussed this.     Muscle Pain     D dimer was twice what it should be at ER, and patient dx with musculoskeletal inflammation.    Foot Pain     Chronic corns on left foot S/P foot surgery, and wants to discuss options on managing them.    Shoulder Pain     Chronic and worsening, mostly with reaching. Patient thinks symptoms are age-related and wants to discuss exercises/stretches to prevent progression.    Weight management     Wants to discuss Ozempic, Wegovy or Moujaro for weight loss.discussed medications and at this time , she would like to try diet and exercise again to try to lose weight. She had done a great job with weight loss this past year and will try to get back on track.     FH of cognition issues     Wants to discuss options to manage memory, since sister dx with memory issues.       HPI     Leticia Trevino is a 67 y.o.  female who presents with a PMHx of  obesity with alveolar hypoventilation, HTN, HLD, ALIX, CKD 3a, who presents to clinic today to establish care.   Her main complaints and concerns today are listed in the chief complaint.       CHRONIC HEALTH ISSUES:   Past Medical History:  Past Medical History:   Diagnosis Date    Autoimmune inflammation of skeletal muscle     Class 2 obesity with alveolar hypoventilation, serious comorbidity, and body mass index (BMI) of 38.0 to 38.9 in adult 04/16/2021    Hypertension     Nocturnal hypoxemia due to obesity     07/18/2022, 07/19/2022 Home Sleep Study  2 night study MILD/BORDERLINE OBSTRUCTIVE SLEEP APNEA with overall AHI 9.5/hr ( 53 events): night # 2 Oxygen desaturation: 84%. SpO2 between 85% to 89% for 3 min. Patient snored 98% time above 50 .Heart rate range: 44 bpm - 74 bpm 6/28/2021 PSG No Significant Obstructive Sleep  apnea(ALIX): AHI was 3.9/hr ( 17 events). The cumulative time under 88% oxygen      Past Surgical History:  Past Surgical History:   Procedure Laterality Date    APPENDECTOMY      FOOT SURGERY Left     TONSILLECTOMY      TUBAL LIGATION         Allergies:  Review of patient's allergies indicates:   Allergen Reactions    Sulfa (sulfonamide antibiotics) Hives and Itching       Home Medications:  Prior to Admission medications    Medication Sig Start Date End Date Taking? Authorizing Provider   clotrimazole-betamethasone 1-0.05% (LOTRISONE) cream Apply topically 2 (two) times a day. 3/4/22  Yes Salome Strong MD   enalapril (VASOTEC) 2.5 MG tablet Take 1 tablet by mouth once daily 22  Yes Salome Strong MD   FERROUS FUMARATE/IRON PS CPLX (FERROUS FUMARATE-IRON PS CMPLX ORAL) Take 1 tablet by mouth twice a week.    Yes Historical Provider   triamcinolone acetonide 0.025% (KENALOG) 0.025 % cream Apply topically 2 (two) times daily. 3/4/22  Yes Salome Strong MD       Family History:  Family History   Problem Relation Age of Onset    Aneurysm Mother     Alcohol abuse Mother     Stroke Mother         aneurism    Stroke Father     Alcohol abuse Father     Cancer Sister     Breast cancer Sister 65    Arthritis Sister     Memory loss Sister     Ovarian cancer Neg Hx        Social History:  Social History     Tobacco Use    Smoking status: Former     Packs/day: 0.50     Years: 21.00     Pack years: 10.50     Types: Cigarettes     Start date: 1970     Quit date: 11/3/1999     Years since quittin.0    Smokeless tobacco: Never   Substance Use Topics    Alcohol use: Yes     Alcohol/week: 4.0 standard drinks     Types: 4 Glasses of wine per week     Comment: occasionally    Drug use: No       Review of Systems:  ROS    Health Maintainence:   TDap is up to date, Influenza is up to date   Pneumovax is up to date.   Zostavax is not UTD.     Says was too expensive. But will try to  "shop around to other pharmacies   [facility]   Cancer Screening:  Colonoscopy: is up to date.   DEXA:  is up to date.   Screening:  Hepatitis C is up to date in pts born between 7482-5083. **    PHYSICAL EXAM     /64 (BP Location: Right arm, Patient Position: Sitting, BP Method: Large (Manual))   Pulse 61   Temp 97.8 °F (36.6 °C)   Ht 5' 1" (1.549 m)   Wt 83 kg (183 lb 1.5 oz)   SpO2 97%   BMI 34.59 kg/m²     GEN - A+OX4, NAD   HEENT - PERRL, EOMI, OP clear. MMM.   Neck - No thyromegaly or cervical LAD. No thyroid masses felt.  CV - RRR, no m/r   Chest - CTAB, no wheezing or rhonchi  Abd - S/NT/ND/+BS.   Ext - 2+BDP and radial pulses. No LE edema.   Neuro - PERRL, EOMI, no nystagmus, eyebrow raise, facial sensation, hearing, m of mastication, smile, palatal raise, shoulder shrug, tongue protrusion symmetric and intact. 5/5 BUE and BLE strength. Sensation to light touch intact throughout. 2+ DTRs. Normal gait.   MSK - No spinal tenderness to palpation. Normal gait.   Skin - No rash.    LABS     Previous labs reviewed.      ASSESSMENT/PLAN     Leticia Trevino is a 67 y.o. female with  1. Encounter to establish care  -medications reviewed and consolidated  -reviewed PMH, medications, HCM including vaccinations.   -reviewed most recent lab work. Reordered as needed. Discussed abnormalities with patient with time allowed for questions.   -reviewed clinic policy with patient including to arrive 15 mins before appointments, labs and results including expected turn around for results.   -outside records and consultants notes reviewed as time allowed. Updated treatment team with name of other providers.   -reviewed and confirmed patients contact information, preferred pharmacy etc.   -AVS provided after visit to summarized things discussed.   -The following assessments were completed:  Living Situation  CAGE  Depression Screening  Timed Get Up and Go  Cognitive Function Screening-Minicog   Nutrition " Screening  ADL Screening    2. Stage 3 chronic kidney disease, unspecified whether stage 3a or 3b CKD  Resolved. Normal renal function on most recent blood word.   Encouraged proper fluid intake   Avoidance of NSAID.     3. Dyslipidemia  Diet and exercise   Encoruaged more cardio excercise    4. Primary hypertension  Very well controlled and at goal.   Continue current medication  Limit dietary salt intake via DASH diet  Encouraged cardio exercise   -     Comprehensive Metabolic Panel; Future; Expected date: 06/02/2023    5. Class 1 obesity with alveolar hypoventilation, serious comorbidity, and body mass index (BMI) of 34.0 to 34.9 in adult  Discussed increasing protein intake  Encouraged more exercise chantal weights and reisstance   Discussed medications, would like to defer for now     6. Primary insomnia  Resolved. On CPAP for ALIX   Sleeps very well     7. ALIX on CPAP  Overview:  07/18/2022, 07/19/2022 Home Sleep Study    2 night study  MILD/BORDERLINE OBSTRUCTIVE SLEEP APNEA with overall AHI 9.5/hr ( 53 events): night # 2  Oxygen desaturation: 84%. SpO2 between 85% to 89% for 3 min.  Patient snored 98% time above 50 .  Heart rate range: 44 bpm - 74 bpm  08/03/2022 obtained Resmed CPAP machine  On Auto CPAP 4-2 cm with optimal control AHI 0.5  Nasal mask  HME: [facility]   11/21/2022 Overnight oximetry on room air on APAP 4-20 cm results pending      8. Circadian rhythm disorder  reviewed    9. Fatigue, unspecified type  -     CBC Auto Differential; Future; Expected date: 06/02/2023  -     TSH; Future; Expected date: 06/02/2023    10. Mixed hyperlipidemia  -     Lipid Panel; Future; Expected date: 06/02/2023    11. Scoliosis, unspecified scoliosis type, unspecified spinal region  Asymptomatic        My total time spent on this encounter was at least 60 minutes which included  the following activities: preparing to see the patient, performing a medically appropriate and/or evaluation, counseling and educating the  patient and family/caregiver, ordering medications, tests, or procedures, referring and communicating with other healthcare providers, documenting clinical information in the electronic or other health record. This time is independent and non-overlapping.         RTC in 6 months, sooner if needed and depending on labs.    Goldie Abraham MD  Board Certified Internist/Geriatrician  [facility] [place] [address] ([place])

## 2022-12-02 NOTE — TELEPHONE ENCOUNTER
No problem.    ----- Message from Marisabel Maxwell sent at 12/2/2022 11:28 AM CST -----  Regarding: [facility]HAELA [facility] SEDIMENTATION RATE ORDER  [phone]- call back ; She will just call the [facility] lab to schedule this order because we are not finding the time she wanted done.    Marisabel

## 2022-12-05 ENCOUNTER — LAB VISIT (OUTPATIENT)
Dept: LAB | Facility: HOSPITAL | Age: 67
End: 2022-12-05
Attending: INTERNAL MEDICINE
Payer: MEDICARE

## 2022-12-05 DIAGNOSIS — R52 BODY ACHES: ICD-10-CM

## 2022-12-05 LAB — ERYTHROCYTE [SEDIMENTATION RATE] IN BLOOD BY WESTERGREN METHOD: 8 MM/HR (ref 0–20)

## 2022-12-05 PROCEDURE — 36415 COLL VENOUS BLD VENIPUNCTURE: CPT | Mod: PO | Performed by: INTERNAL MEDICINE

## 2022-12-05 PROCEDURE — 85651 RBC SED RATE NONAUTOMATED: CPT | Mod: PO | Performed by: INTERNAL MEDICINE

## 2022-12-05 PROCEDURE — 86038 ANTINUCLEAR ANTIBODIES: CPT | Performed by: INTERNAL MEDICINE

## 2022-12-06 LAB — ANA SER QL IF: NORMAL

## 2023-01-04 ENCOUNTER — PATIENT MESSAGE (OUTPATIENT)
Dept: PULMONOLOGY | Facility: CLINIC | Age: 68
End: 2023-01-04
Payer: MEDICARE

## 2023-03-01 ENCOUNTER — PATIENT MESSAGE (OUTPATIENT)
Dept: PRIMARY CARE CLINIC | Facility: CLINIC | Age: 68
End: 2023-03-01
Payer: MEDICARE

## 2023-05-15 ENCOUNTER — TELEPHONE (OUTPATIENT)
Dept: PRIMARY CARE CLINIC | Facility: CLINIC | Age: 68
End: 2023-05-15
Payer: MEDICARE

## 2023-05-15 DIAGNOSIS — Z12.39 ENCOUNTER FOR SCREENING FOR MALIGNANT NEOPLASM OF BREAST, UNSPECIFIED SCREENING MODALITY: Primary | ICD-10-CM

## 2023-05-15 DIAGNOSIS — Z12.31 ENCOUNTER FOR SCREENING MAMMOGRAM FOR MALIGNANT NEOPLASM OF BREAST: ICD-10-CM

## 2023-05-15 NOTE — TELEPHONE ENCOUNTER
----- Message from Karma Gimenez sent at 5/15/2023  2:01 PM CDT -----  Regarding: Annual mammo request  Type:  Mammogram    Caller is requesting to schedule their annual mammogram appointment.  Order is not listed in EPIC.  Please enter order and contact patient to schedule.    Name of Caller: Leticia     Where would they like the mammogram performed? [place] location     Would the patient rather a call back or a response via [facility]? Call back     Best Call Back Number: [phone]    Additional Information: the patient is requesting an order for her annual mammogram. She would like to have it done at the [place] location. Please return call to schedule at earliest convenience.

## 2023-05-15 NOTE — TELEPHONE ENCOUNTER
Patient requests an order for her annual mammogram. Her last one was 5/13/22. Her next office visit is 6/2/23.

## 2023-05-23 ENCOUNTER — HOSPITAL ENCOUNTER (OUTPATIENT)
Dept: RADIOLOGY | Facility: HOSPITAL | Age: 68
Discharge: HOME | End: 2023-05-23
Attending: INTERNAL MEDICINE
Payer: MEDICARE

## 2023-05-23 DIAGNOSIS — Z12.31 ENCOUNTER FOR SCREENING MAMMOGRAM FOR MALIGNANT NEOPLASM OF BREAST: ICD-10-CM

## 2023-05-23 DIAGNOSIS — Z12.39 ENCOUNTER FOR SCREENING FOR MALIGNANT NEOPLASM OF BREAST, UNSPECIFIED SCREENING MODALITY: ICD-10-CM

## 2023-05-23 PROCEDURE — 77063 MAMMO DIGITAL SCREENING BILAT WITH TOMO: ICD-10-PCS | Mod: 26,,, | Performed by: RADIOLOGY

## 2023-05-23 PROCEDURE — 77067 MAMMO DIGITAL SCREENING BILAT WITH TOMO: ICD-10-PCS | Mod: 26,,, | Performed by: RADIOLOGY

## 2023-05-23 PROCEDURE — 77067 SCR MAMMO BI INCL CAD: CPT | Mod: TC,PO

## 2023-05-23 PROCEDURE — 77063 BREAST TOMOSYNTHESIS BI: CPT | Mod: 26,,, | Performed by: RADIOLOGY

## 2023-05-23 PROCEDURE — 77067 SCR MAMMO BI INCL CAD: CPT | Mod: 26,,, | Performed by: RADIOLOGY

## 2023-05-25 ENCOUNTER — TELEPHONE (OUTPATIENT)
Dept: PRIMARY CARE CLINIC | Facility: CLINIC | Age: 68
End: 2023-05-25
Payer: MEDICARE

## 2023-05-25 NOTE — TELEPHONE ENCOUNTER
Called pt, no answer. Left message for call back, requested pt to come in on 6/2/2023 for 0920, moved appt but will need to confirm appt with pt.

## 2023-05-26 ENCOUNTER — LAB VISIT (OUTPATIENT)
Dept: LAB | Facility: HOSPITAL | Age: 68
End: 2023-05-26
Attending: INTERNAL MEDICINE
Payer: MEDICARE

## 2023-05-26 DIAGNOSIS — E78.2 MIXED HYPERLIPIDEMIA: ICD-10-CM

## 2023-05-26 DIAGNOSIS — R53.83 FATIGUE, UNSPECIFIED TYPE: ICD-10-CM

## 2023-05-26 DIAGNOSIS — I10 PRIMARY HYPERTENSION: ICD-10-CM

## 2023-05-26 LAB
ALBUMIN SERPL BCP-MCNC: 3.7 G/DL (ref 3.5–5.2)
ALP SERPL-CCNC: 39 U/L (ref 55–135)
ALT SERPL W/O P-5'-P-CCNC: 11 U/L (ref 10–44)
ANION GAP SERPL CALC-SCNC: 7 MMOL/L (ref 8–16)
AST SERPL-CCNC: 15 U/L (ref 10–40)
BASOPHILS # BLD AUTO: 0.03 K/UL (ref 0–0.2)
BASOPHILS NFR BLD: 0.5 % (ref 0–1.9)
BILIRUB SERPL-MCNC: 0.3 MG/DL (ref 0.1–1)
BUN SERPL-MCNC: 25 MG/DL (ref 8–23)
CALCIUM SERPL-MCNC: 9.5 MG/DL (ref 8.7–10.5)
CHLORIDE SERPL-SCNC: 109 MMOL/L (ref 95–110)
CHOLEST SERPL-MCNC: 197 MG/DL (ref 120–199)
CHOLEST/HDLC SERPL: 3.2 {RATIO} (ref 2–5)
CO2 SERPL-SCNC: 25 MMOL/L (ref 23–29)
CREAT SERPL-MCNC: 1 MG/DL (ref 0.5–1.4)
DIFFERENTIAL METHOD: ABNORMAL
EOSINOPHIL # BLD AUTO: 0.3 K/UL (ref 0–0.5)
EOSINOPHIL NFR BLD: 5.9 % (ref 0–8)
ERYTHROCYTE [DISTWIDTH] IN BLOOD BY AUTOMATED COUNT: 14.3 % (ref 11.5–14.5)
EST. GFR  (NO RACE VARIABLE): >60 ML/MIN/1.73 M^2
GLUCOSE SERPL-MCNC: 94 MG/DL (ref 70–110)
HCT VFR BLD AUTO: 40 % (ref 37–48.5)
HDLC SERPL-MCNC: 61 MG/DL (ref 40–75)
HDLC SERPL: 31 % (ref 20–50)
HGB BLD-MCNC: 12.2 G/DL (ref 12–16)
IMM GRANULOCYTES # BLD AUTO: 0.01 K/UL (ref 0–0.04)
IMM GRANULOCYTES NFR BLD AUTO: 0.2 % (ref 0–0.5)
LDLC SERPL CALC-MCNC: 125.6 MG/DL (ref 63–159)
LYMPHOCYTES # BLD AUTO: 1.3 K/UL (ref 1–4.8)
LYMPHOCYTES NFR BLD: 22.4 % (ref 18–48)
MCH RBC QN AUTO: 27.8 PG (ref 27–31)
MCHC RBC AUTO-ENTMCNC: 30.5 G/DL (ref 32–36)
MCV RBC AUTO: 91 FL (ref 82–98)
MONOCYTES # BLD AUTO: 0.4 K/UL (ref 0.3–1)
MONOCYTES NFR BLD: 6.5 % (ref 4–15)
NEUTROPHILS # BLD AUTO: 3.7 K/UL (ref 1.8–7.7)
NEUTROPHILS NFR BLD: 64.5 % (ref 38–73)
NONHDLC SERPL-MCNC: 136 MG/DL
NRBC BLD-RTO: 0 /100 WBC
PLATELET # BLD AUTO: 226 K/UL (ref 150–450)
PMV BLD AUTO: 10.7 FL (ref 9.2–12.9)
POTASSIUM SERPL-SCNC: 4.4 MMOL/L (ref 3.5–5.1)
PROT SERPL-MCNC: 6.6 G/DL (ref 6–8.4)
RBC # BLD AUTO: 4.39 M/UL (ref 4–5.4)
SODIUM SERPL-SCNC: 141 MMOL/L (ref 136–145)
TRIGL SERPL-MCNC: 52 MG/DL (ref 30–150)
TSH SERPL DL<=0.005 MIU/L-ACNC: 2.57 UIU/ML (ref 0.4–4)
WBC # BLD AUTO: 5.72 K/UL (ref 3.9–12.7)

## 2023-05-26 PROCEDURE — 84443 ASSAY THYROID STIM HORMONE: CPT | Performed by: INTERNAL MEDICINE

## 2023-05-26 PROCEDURE — 85025 COMPLETE CBC W/AUTO DIFF WBC: CPT | Performed by: INTERNAL MEDICINE

## 2023-05-26 PROCEDURE — 36415 COLL VENOUS BLD VENIPUNCTURE: CPT | Mod: PO | Performed by: INTERNAL MEDICINE

## 2023-05-26 PROCEDURE — 80053 COMPREHEN METABOLIC PANEL: CPT | Performed by: INTERNAL MEDICINE

## 2023-05-26 PROCEDURE — 80061 LIPID PANEL: CPT | Performed by: INTERNAL MEDICINE

## 2023-05-29 ENCOUNTER — TELEPHONE (OUTPATIENT)
Dept: PRIMARY CARE CLINIC | Facility: CLINIC | Age: 68
End: 2023-05-29
Payer: MEDICARE

## 2023-05-29 NOTE — TELEPHONE ENCOUNTER
----- Message from Goldie Abraham MD sent at 5/29/2023  7:38 AM CDT -----  Labs have been reviewed. Will discuss at appointment on 6/2

## 2023-05-29 NOTE — TELEPHONE ENCOUNTER
Spoke with pt, discussed your message. Pt verbalized understanding.    She will see you at her appt on 6/2/2023

## 2023-06-02 ENCOUNTER — OFFICE VISIT (OUTPATIENT)
Dept: PRIMARY CARE CLINIC | Facility: CLINIC | Age: 68
End: 2023-06-02
Payer: MEDICARE

## 2023-06-02 VITALS
HEART RATE: 65 BPM | SYSTOLIC BLOOD PRESSURE: 120 MMHG | OXYGEN SATURATION: 97 % | DIASTOLIC BLOOD PRESSURE: 68 MMHG | TEMPERATURE: 98 F | HEIGHT: 61 IN | BODY MASS INDEX: 31.92 KG/M2 | RESPIRATION RATE: 18 BRPM | WEIGHT: 169.06 LBS

## 2023-06-02 DIAGNOSIS — E78.5 DYSLIPIDEMIA: ICD-10-CM

## 2023-06-02 DIAGNOSIS — R63.4 WEIGHT LOSS: ICD-10-CM

## 2023-06-02 DIAGNOSIS — N18.30 STAGE 3 CHRONIC KIDNEY DISEASE, UNSPECIFIED WHETHER STAGE 3A OR 3B CKD: ICD-10-CM

## 2023-06-02 DIAGNOSIS — F51.01 PRIMARY INSOMNIA: ICD-10-CM

## 2023-06-02 DIAGNOSIS — I10 PRIMARY HYPERTENSION: Primary | ICD-10-CM

## 2023-06-02 DIAGNOSIS — E78.2 MIXED HYPERLIPIDEMIA: ICD-10-CM

## 2023-06-02 DIAGNOSIS — E66.2 CLASS 1 OBESITY WITH ALVEOLAR HYPOVENTILATION, SERIOUS COMORBIDITY, AND BODY MASS INDEX (BMI) OF 34.0 TO 34.9 IN ADULT: ICD-10-CM

## 2023-06-02 DIAGNOSIS — G47.33 OSA ON CPAP: ICD-10-CM

## 2023-06-02 PROCEDURE — 99215 PR OFFICE/OUTPT VISIT, EST, LEVL V, 40-54 MIN: ICD-10-PCS | Mod: S$PBB,,, | Performed by: INTERNAL MEDICINE

## 2023-06-02 PROCEDURE — 99215 OFFICE O/P EST HI 40 MIN: CPT | Mod: S$PBB,,, | Performed by: INTERNAL MEDICINE

## 2023-06-02 PROCEDURE — 99999 PR PBB SHADOW E&M-EST. PATIENT-LVL III: CPT | Mod: PBBFAC,,, | Performed by: INTERNAL MEDICINE

## 2023-06-02 PROCEDURE — 99213 OFFICE O/P EST LOW 20 MIN: CPT | Mod: PBBFAC,PN | Performed by: INTERNAL MEDICINE

## 2023-06-02 PROCEDURE — 99999 PR PBB SHADOW E&M-EST. PATIENT-LVL III: ICD-10-PCS | Mod: PBBFAC,,, | Performed by: INTERNAL MEDICINE

## 2023-06-02 NOTE — PROGRESS NOTES
INTERNAL MEDICINE PROGRESS/URGENT CARE NOTE    CHIEF COMPLAINT     Chief Complaint   Patient presents with    Follow-up     Review labs        HPI     Leticia Trevino is a 67 y.o.  female who presents with a PMHx of  obesity with alveolar hypoventilation, HTN, HLD, ALIX, CKD 3a, who presents to clinic today for her 6-month followup.     Patient has done a wonderful and incredible job with her diet and has lost almost 20 pounds with diet and exercise. She has started seeing a nutritionist since her last visit and has sone such an amazing job with her weight loss. I couldn't be more proud of her and her efforts.       Her main complaints and concerns today: was concerned about her labs     ALIX: compliant with CPAP but now with the weight loss     CKD3a: normalized with hydration and diet     Obesity with alveolar hypoventilation: regarding her diet and exercise, she reports eating much better and doing weights for excercise        Home Medications:  Prior to Admission medications    Medication Sig Start Date End Date Taking? Authorizing Provider   clotrimazole-betamethasone 1-0.05% (LOTRISONE) cream Apply topically 2 (two) times a day. 3/4/22   Salome Strong MD   enalapril (VASOTEC) 2.5 MG tablet Take 1 tablet by mouth once daily 8/11/22   Salome Strong MD   FERROUS FUMARATE/IRON PS CPLX (FERROUS FUMARATE-IRON PS CMPLX ORAL) Take 1 tablet by mouth twice a week.     Historical Provider   triamcinolone acetonide 0.025% (KENALOG) 0.025 % cream APPLY  CREAM TOPICALLY TWICE DAILY 3/13/23   Goldie Abraham MD       Review of Systems:  Review of Systems        Advance Care Planning     Date: 06/02/2023    Power of   I initiated the process of advance care planning today and explained the importance of this process to the patient.  I introduced the concept of advance directives to the patient, as well. Then the patient received detailed information about the importance of designating  "a Health Care Power of  (HCPOA). She was also instructed to communicate with this person about their wishes for future healthcare, should she become sick and lose decision-making capacity. The patient has not previously appointed a HCPOA. After our discussion, the patient has decided to complete a HCPOA and has appointed her significant other, health care agent:  name on file  & health care agent number:  name on file  I spent a total time of 10 minutes discussing this issue with the patient.            .AWV last done over a year.   Ordered today       PHYSICAL EXAM     /68 (BP Location: Right arm, Patient Position: Sitting, BP Method: Medium (Manual))   Pulse 65   Temp 98.2 °F (36.8 °C) (Oral)   Resp 18   Ht 5' 1" (1.549 m)   Wt 76.7 kg (169 lb 1.5 oz)   SpO2 97%   BMI 31.95 kg/m²     GEN - A+OX4, NAD   HEENT - PERRL, EOMI, OP clear  Neck - No thyromegaly or cervical LAD. No thyroid masses felt.  CV - RRR, no m/r   Chest - CTAB, no wheezing or rhonchi  Abd - S/NT/ND/+BS.   Ext - 2+BDP and radial pulses. No C/C/E.  Skin - No rash.    LABS         ASSESSMENT/PLAN     Leticia Trevino is a 68 y.o. female with  1. Primary hypertension  Overview:  BP very wlel controlled but has improved with her 20 pound weigt loss through diet and exercise  Continue wonderful work  Limit salt in diet       2. Stage 3 chronic kidney disease, unspecified whether stage 3a or 3b CKD  Overview:  Improved with diet   Increase water intake       3. Dyslipidemia  Improved with weight loss    4. Primary insomnia  Overview:  ALIX on CPAP       5. Class 1 obesity with alveolar hypoventilation, serious comorbidity, and body mass index (BMI) of 34.0 to 34.9 in adult  Overview:  Has lost 20 pounds through diet and exercise. Continue wonderful efforts  Continue with nutritionist       6. ALIX on CPAP  Overview:  07/18/2022, 07/19/2022 Home Sleep Study    2 night study  MILD/BORDERLINE OBSTRUCTIVE SLEEP APNEA with overall AHI " 9.5/hr ( 53 events): night # 2  Oxygen desaturation: 84%. SpO2 between 85% to 89% for 3 min.  Patient snored 98% time above 50 .  Heart rate range: 44 bpm - 74 bpm  08/03/2022 obtained Resmed CPAP machine  On Auto CPAP 4-2 cm with optimal control AHI 0.5  Nasal mask  HME: [facility]   11/21/2022 Overnight oximetry on room air on APAP 4-20 cm results pending      7. Weight loss  Overview:  Intentional. With the help of a nutritionist       8. Mixed hyperlipidemia  Overview:  Cholesterol improved with wieght loss        My total time spent on this encounter was 52 minutes which included  the following activities: preparing to see the patient, performing a medically appropriate and/or evaluation, counseling and educating the patient and family/caregiver, ordering medications, tests, or procedures, referring and communicating with other healthcare providers, documenting clinical information in the electronic or other health record. This time is independent and non-overlapping.         WORRY SCORE 1    RTC in 6 months, sooner if needed and depending on labs.    Goldie Abraham MD  Board Certified Internist/Geriatrician  [facility] ([place])

## 2023-06-07 ENCOUNTER — PATIENT MESSAGE (OUTPATIENT)
Dept: PRIMARY CARE CLINIC | Facility: CLINIC | Age: 68
End: 2023-06-07
Payer: MEDICARE

## 2023-06-09 NOTE — TELEPHONE ENCOUNTER
These are the diagnosis her referring doctor used to get her insurance to cover her dietician.   Let me know if you want to call Dr. Chery.

## 2023-06-13 ENCOUNTER — PATIENT MESSAGE (OUTPATIENT)
Dept: PRIMARY CARE CLINIC | Facility: CLINIC | Age: 68
End: 2023-06-13
Payer: MEDICARE

## 2023-06-15 ENCOUNTER — PATIENT MESSAGE (OUTPATIENT)
Dept: PRIMARY CARE CLINIC | Facility: CLINIC | Age: 68
End: 2023-06-15
Payer: MEDICARE

## 2023-06-15 NOTE — TELEPHONE ENCOUNTER
Patient is asking if she should be worried about being contagious. She has attached pictures also of her rash.

## 2023-07-07 ENCOUNTER — PATIENT MESSAGE (OUTPATIENT)
Dept: INFECTIOUS DISEASES | Facility: CLINIC | Age: 68
End: 2023-07-07
Payer: MEDICARE

## 2023-08-09 ENCOUNTER — TELEPHONE (OUTPATIENT)
Dept: ADMINISTRATIVE | Facility: CLINIC | Age: 68
End: 2023-08-09
Payer: MEDICARE

## 2023-08-11 ENCOUNTER — OFFICE VISIT (OUTPATIENT)
Dept: FAMILY MEDICINE | Facility: CLINIC | Age: 68
End: 2023-08-11
Payer: MEDICARE

## 2023-08-11 VITALS
WEIGHT: 167.75 LBS | HEART RATE: 55 BPM | SYSTOLIC BLOOD PRESSURE: 120 MMHG | DIASTOLIC BLOOD PRESSURE: 62 MMHG | OXYGEN SATURATION: 97 % | BODY MASS INDEX: 31.67 KG/M2 | HEIGHT: 61 IN

## 2023-08-11 DIAGNOSIS — Z00.00 ENCOUNTER FOR PREVENTIVE HEALTH EXAMINATION: Primary | ICD-10-CM

## 2023-08-11 DIAGNOSIS — I10 PRIMARY HYPERTENSION: ICD-10-CM

## 2023-08-11 DIAGNOSIS — G47.33 OSA ON CPAP: ICD-10-CM

## 2023-08-11 DIAGNOSIS — E78.2 MIXED HYPERLIPIDEMIA: ICD-10-CM

## 2023-08-11 DIAGNOSIS — N18.30 STAGE 3 CHRONIC KIDNEY DISEASE, UNSPECIFIED WHETHER STAGE 3A OR 3B CKD: ICD-10-CM

## 2023-08-11 DIAGNOSIS — E66.2 CLASS 1 OBESITY WITH ALVEOLAR HYPOVENTILATION, SERIOUS COMORBIDITY, AND BODY MASS INDEX (BMI) OF 31.0 TO 31.9 IN ADULT: ICD-10-CM

## 2023-08-11 PROBLEM — Z76.89 ENCOUNTER TO ESTABLISH CARE: Status: RESOLVED | Noted: 2022-12-02 | Resolved: 2023-08-11

## 2023-08-11 PROCEDURE — 99999 PR PBB SHADOW E&M-EST. PATIENT-LVL III: ICD-10-PCS | Mod: PBBFAC,,, | Performed by: NURSE PRACTITIONER

## 2023-08-11 PROCEDURE — G0439 PPPS, SUBSEQ VISIT: HCPCS | Mod: ,,, | Performed by: NURSE PRACTITIONER

## 2023-08-11 PROCEDURE — G0439 PR MEDICARE ANNUAL WELLNESS SUBSEQUENT VISIT: ICD-10-PCS | Mod: ,,, | Performed by: NURSE PRACTITIONER

## 2023-08-11 PROCEDURE — 99999 PR PBB SHADOW E&M-EST. PATIENT-LVL III: CPT | Mod: PBBFAC,,, | Performed by: NURSE PRACTITIONER

## 2023-08-11 PROCEDURE — 99213 OFFICE O/P EST LOW 20 MIN: CPT | Mod: PBBFAC,PO | Performed by: NURSE PRACTITIONER

## 2023-08-11 NOTE — PATIENT INSTRUCTIONS
Counseling and Referral of Other Preventative  (Italic type indicates deductible and co-insurance are waived)    Patient Name: Leticia Trevino  Today's Date: 8/11/2023    Health Maintenance       Date Due Completion Date    COVID-19 Vaccine (6 - Pfizer series) 02/28/2023 10/28/2022    TETANUS VACCINE 07/12/2023 7/12/2013    Influenza Vaccine (1) 09/01/2023 10/28/2022    DEXA Scan 04/27/2024 4/27/2021    Mammogram 05/23/2024 5/23/2023    Lipid Panel 05/26/2024 5/26/2023    Colorectal Cancer Screening 02/24/2025 2/24/2020    Hemoglobin A1c (Diabetic Prevention Screening) 04/17/2026 4/17/2023        No orders of the defined types were placed in this encounter.    The following information is provided to all patients.  This information is to help you find resources for any of the problems found today that may be affecting your health:                Living healthy guide: www.[facility].[place].[facility]      Understanding Diabetes: www.diabetes.org      Eating healthy: www.cdc.gov/healthyweight      [facility] home safety checklist: www.cdc.gov/steadi/patient.html      Agency on Aging: www.goea.[place].gov      Alcoholics anonymous (AA): www.aa.org      Physical Activity: www.al.nih.gov/on8kumd      Tobacco use: www.quitwithusla.org

## 2023-08-11 NOTE — PROGRESS NOTES
"  Leticia Trevino presented for a  Medicare AWV and comprehensive Health Risk Assessment today. The following components were reviewed and updated:    Medical history  Family History  Social history  Allergies and Current Medications  Health Risk Assessment  Health Maintenance  Care Team     ** See Completed Assessments for Annual Wellness Visit within the encounter summary.**     The following assessments were completed:  Living Situation  CAGE  Depression Screening  Timed Get Up and Go  Whisper Test  Cognitive Function Screening      Nutrition Screening  ADL Screening  PAQ Screening    Review for Opioid Screening: Pt does not have Rx for Opioids  Review for Substance Use Disorders: Patient does not use substance      Vitals:    08/11/23 0907   BP: 120/62   BP Location: Left arm   Patient Position: Sitting   BP Method: Medium (Manual)   Pulse: (!) 55   SpO2: 97%   Weight: 76.1 kg (167 lb 12.3 oz)   Height: 5' 1" (1.549 m)     Body mass index is 31.7 kg/m².  Physical Exam  Vitals reviewed.   Constitutional:       General: She is not in acute distress.  Pulmonary:      Effort: Pulmonary effort is normal. No respiratory distress.   Neurological:      Mental Status: She is alert and oriented to person, place, and time.   Psychiatric:         Mood and Affect: Mood normal.         Behavior: Behavior normal.         Thought Content: Thought content normal.         Judgment: Judgment normal.     Diagnoses and health risks identified today and associated recommendations/orders:    1. Encounter for preventive health examination  Reviewed and discussed health maintenance.      2. Stage 3 chronic kidney disease, unspecified whether stage 3a or 3b CKD  Stable- continue current treatment and follow up routinely with PCP   Avoid NSAIDs and increase fluids    3. Class 1 obesity with alveolar hypoventilation, serious comorbidity, and body mass index (BMI) of 31.0 to 31.9 in adult  Encouraged healthy eating, weight loss and routine " exercise     4. Mixed hyperlipidemia  Stable- continue current treatment and follow up routinely with PCP     5. Primary hypertension  Stable- continue current treatment and follow up routinely with PCP     6. ALIX on CPAP  Wears CPAP nightly an benefits from use     Provided Leticia with a 5-10 year written screening schedule and personal prevention plan. Recommendations were developed using the USPSTF age appropriate recommendations. Education, counseling, and referrals were provided as needed. After Visit Summary printed and given to patient which includes a list of additional screenings\tests needed.    I offered to discuss end of life issues, including information on how to make advance directives that the patient could use to name someone who would make medical decisions on their behalf if they became too ill to make themselves.  ___Patient declined  _X_Patient is interested, I provided paper work and offered to discuss.  Libertad Hayden NP

## 2023-08-17 ENCOUNTER — PATIENT MESSAGE (OUTPATIENT)
Dept: PRIMARY CARE CLINIC | Facility: CLINIC | Age: 68
End: 2023-08-17
Payer: MEDICARE

## 2023-08-17 ENCOUNTER — PATIENT MESSAGE (OUTPATIENT)
Dept: FAMILY MEDICINE | Facility: CLINIC | Age: 68
End: 2023-08-17
Payer: MEDICARE

## 2023-08-17 DIAGNOSIS — Z01.419 ENCOUNTER FOR CERVICAL PAP SMEAR WITH PELVIC EXAM: Primary | ICD-10-CM

## 2023-09-18 RX ORDER — ENALAPRIL MALEATE 2.5 MG/1
TABLET ORAL
Qty: 90 TABLET | Refills: 3 | Status: SHIPPED | OUTPATIENT
Start: 2023-09-18

## 2023-09-18 NOTE — TELEPHONE ENCOUNTER
Refill Routing Note   Medication(s) are not appropriate for processing by [facility] for the following reason(s):      Non-participating provider  Responsible provider unclear    [facility] action(s):  Route Care Due:  None identified            Appointments  past 12m or future 3m with PCP    Date Provider   Last Visit   6/2/2023 Goldie Abraham MD   Next Visit   12/1/2023 Goldie Abraham MD   ED visits in past 90 days: 0        Note composed:2:11 PM 09/18/2023

## 2023-09-19 ENCOUNTER — PATIENT MESSAGE (OUTPATIENT)
Dept: PRIMARY CARE CLINIC | Facility: CLINIC | Age: 68
End: 2023-09-19
Payer: MEDICARE

## 2023-11-02 ENCOUNTER — PATIENT MESSAGE (OUTPATIENT)
Dept: PRIMARY CARE CLINIC | Facility: CLINIC | Age: 68
End: 2023-11-02
Payer: MEDICARE

## 2023-11-03 ENCOUNTER — OFFICE VISIT (OUTPATIENT)
Dept: PRIMARY CARE CLINIC | Facility: CLINIC | Age: 68
End: 2023-11-03
Payer: MEDICARE

## 2023-11-03 VITALS
WEIGHT: 171.5 LBS | RESPIRATION RATE: 20 BRPM | HEIGHT: 61 IN | HEART RATE: 78 BPM | DIASTOLIC BLOOD PRESSURE: 64 MMHG | BODY MASS INDEX: 32.38 KG/M2 | OXYGEN SATURATION: 97 % | SYSTOLIC BLOOD PRESSURE: 128 MMHG

## 2023-11-03 DIAGNOSIS — L23.7 CONTACT DERMATITIS DUE TO POISON IVY: ICD-10-CM

## 2023-11-03 DIAGNOSIS — R21 RASH: Primary | ICD-10-CM

## 2023-11-03 PROCEDURE — 99999 PR PBB SHADOW E&M-EST. PATIENT-LVL III: CPT | Mod: PBBFAC,,, | Performed by: INTERNAL MEDICINE

## 2023-11-03 PROCEDURE — 99212 OFFICE O/P EST SF 10 MIN: CPT | Mod: S$PBB,,, | Performed by: INTERNAL MEDICINE

## 2023-11-03 PROCEDURE — 99213 OFFICE O/P EST LOW 20 MIN: CPT | Mod: PBBFAC,PN | Performed by: INTERNAL MEDICINE

## 2023-11-03 PROCEDURE — 99999 PR PBB SHADOW E&M-EST. PATIENT-LVL III: ICD-10-PCS | Mod: PBBFAC,,, | Performed by: INTERNAL MEDICINE

## 2023-11-03 PROCEDURE — 99212 PR OFFICE/OUTPT VISIT, EST, LEVL II, 10-19 MIN: ICD-10-PCS | Mod: S$PBB,,, | Performed by: INTERNAL MEDICINE

## 2023-11-03 NOTE — PROGRESS NOTES
INTERNAL MEDICINE PROGRESS/URGENT CARE NOTE    CHIEF COMPLAINT     Chief Complaint   Patient presents with    Rash     Rash since Monday and itching -- knees, bilateral lower extremities, and lower back  Possible from plant contact   Pt tried taking rx medication creams- triamcinolone acetonide 0.025% (KENALOG) 0.025 % cream         HPI     Leticia Trevino is a 68 y.o.  female who presents for an urgent/follow up visit today.    Patient is here for an acute visit for a rash on her knees and forearm.   Admits that she was in her garden and likely got poison ivy  Has been using a topical steroid and it seems to be getting better. Counseled her to keep it going and maybe take an oral antihistamine to help with the itching.     She is doing so so well with her health. Shes lost a lot of weight with diet and exercise. Has started swimming again and loves it. Has reconnected with old friends. Overall, this lydia lady is dong very very well.         Past Medical History:  Past Medical History:   Diagnosis Date    Autoimmune inflammation of skeletal muscle     Class 2 obesity with alveolar hypoventilation, serious comorbidity, and body mass index (BMI) of 38.0 to 38.9 in adult 04/16/2021    Hypertension     Nocturnal hypoxemia due to obesity     07/18/2022, 07/19/2022 Home Sleep Study  2 night study MILD/BORDERLINE OBSTRUCTIVE SLEEP APNEA with overall AHI 9.5/hr ( 53 events): night # 2 Oxygen desaturation: 84%. SpO2 between 85% to 89% for 3 min. Patient snored 98% time above 50 .Heart rate range: 44 bpm - 74 bpm 6/28/2021 PSG No Significant Obstructive Sleep apnea(ALIX): AHI was 3.9/hr ( 17 events). The cumulative time under 88% oxygen        Home Medications:  Prior to Admission medications    Medication Sig Start Date End Date Taking? Authorizing Provider   clotrimazole-betamethasone 1-0.05% (LOTRISONE) cream Apply topically 2 (two) times a day. 3/4/22  Yes Salome Strong MD   enalapril (VASOTEC) 2.5  "MG tablet Take 1 tablet by mouth once daily 9/18/23  Yes Goldie Abraham MD   FERROUS FUMARATE/IRON PS CPLX (FERROUS FUMARATE-IRON PS CMPLX ORAL) Take 1 tablet by mouth twice a week.    Yes Provider, Historical   triamcinolone acetonide 0.025% (KENALOG) 0.025 % cream APPLY  CREAM TOPICALLY TWICE DAILY 9/1/23  Yes Goldie Abraham MD       Review of Systems:  Review of Systems      Date: 11/03/2023    Power of   I initiated the process of voluntary advance care planning today and explained the importance of this process to the patient.  I introduced the concept of advance directives to the patient, as well. Then the patient received detailed information about the importance of designating a Health Care Power of  (HCPOA). She was also instructed to communicate with this person about their wishes for future healthcare, should she become sick and lose decision-making capacity. The patient has not previously appointed a HCPOA. After our discussion, the patient has not decided to complete a HCPOA and has appointed her significant other, health care agent:  on file  & health care agent number:  on file  I spent a total time of 10 minutes discussing this issue with the patient.              PHYSICAL EXAM     /64 (BP Location: Left arm, Patient Position: Sitting, BP Method: Medium (Manual))   Pulse 78   Resp 20   Ht 5' 1" (1.549 m)   Wt 77.8 kg (171 lb 8.3 oz)   SpO2 97%   BMI 32.41 kg/m²     GEN - A+OX4, NAD     Skin - No rash.    LABS       ASSESSMENT/PLAN     Leticia Trevino is a 68 y.o. female with  1. Rash  Contact dermatitis.   Avoid allergen.   Topical steroid and oral antihistamine       2. Contact dermatitis due to poison ivy  Contact dermatitis.   Avoid allergen.   Topical steroid and oral antihistamine        WORRY SCORE 1    RTC as scheduled     Goldie Abraham MD  Board Certified Internist/Geriatrician  [facility] [place] [address] ([place])      "

## 2023-11-06 ENCOUNTER — TELEPHONE (OUTPATIENT)
Dept: PRIMARY CARE CLINIC | Facility: CLINIC | Age: 68
End: 2023-11-06
Payer: MEDICARE

## 2023-11-06 NOTE — TELEPHONE ENCOUNTER
Patient is planning to go swimming in a public pool. She wants to know if it is advised due to her still having a rash.

## 2023-11-06 NOTE — TELEPHONE ENCOUNTER
She may go swimming. The actual poison ivy rash is not contagious just the chemicals on the clothes from the initial exposure. She is free to go swimming. I do possibly worry about the chlorine irritating her skin though

## 2023-11-06 NOTE — TELEPHONE ENCOUNTER
----- Message from Marisabel Maxwell sent at 11/6/2023  2:45 PM CST -----  Regarding: patient advice  [phone] - call back; need advice if it's okay for her to swim even if she still itching and rash spreading a little bit on the other parts of the body.      Marisabel

## 2023-11-24 ENCOUNTER — PATIENT MESSAGE (OUTPATIENT)
Dept: PRIMARY CARE CLINIC | Facility: CLINIC | Age: 68
End: 2023-11-24
Payer: MEDICARE

## 2023-12-19 DIAGNOSIS — R23.8 SKIN IRRITATION: ICD-10-CM

## 2023-12-19 RX ORDER — TRIAMCINOLONE ACETONIDE 0.25 MG/G
CREAM TOPICAL
Qty: 15 G | Refills: 0 | Status: SHIPPED | OUTPATIENT
Start: 2023-12-19

## 2024-01-09 ENCOUNTER — TELEPHONE (OUTPATIENT)
Dept: PULMONOLOGY | Facility: CLINIC | Age: 69
End: 2024-01-09
Payer: MEDICARE

## 2024-01-09 NOTE — TELEPHONE ENCOUNTER
----- Message from Cortez Reese sent at 1/9/2024  8:04 AM CST -----  Regarding: Read cpap  Type:  Needs Medical Advice    Who Called: Pt      Would the patient rather a call back or a response via [facility]? Call back    Best Call Back Number: [phone]      Additional Information: Sts that she came in before to have her Cpap card results and she needs to do it again.  Sts she doesn't know who she did it was in [place]. Sts she needs to have it done and needs someone to call her to set it up.   Please advise -- Thank you

## 2024-01-09 NOTE — TELEPHONE ENCOUNTER
Placed a call to the pt in regards to her CPAP machine. The [employer] company that the pt is using cannot read the card. Pt call  and said one of our providers did this before. I informed the pt that she has never seen any of our providers, but the pt hung up the phone. Pt sees a Dr. Chery.

## 2024-01-24 ENCOUNTER — PATIENT MESSAGE (OUTPATIENT)
Dept: PRIMARY CARE CLINIC | Facility: CLINIC | Age: 69
End: 2024-01-24
Payer: MEDICARE

## 2024-01-24 ENCOUNTER — PATIENT MESSAGE (OUTPATIENT)
Dept: PULMONOLOGY | Facility: CLINIC | Age: 69
End: 2024-01-24
Payer: MEDICARE

## 2024-01-26 ENCOUNTER — OFFICE VISIT (OUTPATIENT)
Dept: PRIMARY CARE CLINIC | Facility: CLINIC | Age: 69
End: 2024-01-26
Payer: MEDICARE

## 2024-01-26 VITALS
BODY MASS INDEX: 35.91 KG/M2 | HEIGHT: 59 IN | HEART RATE: 60 BPM | WEIGHT: 178.13 LBS | TEMPERATURE: 98 F | SYSTOLIC BLOOD PRESSURE: 126 MMHG | DIASTOLIC BLOOD PRESSURE: 66 MMHG | RESPIRATION RATE: 20 BRPM | OXYGEN SATURATION: 98 %

## 2024-01-26 DIAGNOSIS — I10 PRIMARY HYPERTENSION: ICD-10-CM

## 2024-01-26 DIAGNOSIS — M41.9 SCOLIOSIS, UNSPECIFIED SCOLIOSIS TYPE, UNSPECIFIED SPINAL REGION: ICD-10-CM

## 2024-01-26 DIAGNOSIS — F51.01 PRIMARY INSOMNIA: ICD-10-CM

## 2024-01-26 DIAGNOSIS — E66.2 CLASS 1 OBESITY WITH ALVEOLAR HYPOVENTILATION, SERIOUS COMORBIDITY, AND BODY MASS INDEX (BMI) OF 34.0 TO 34.9 IN ADULT: ICD-10-CM

## 2024-01-26 DIAGNOSIS — G47.33 OSA ON CPAP: ICD-10-CM

## 2024-01-26 DIAGNOSIS — E78.2 MIXED HYPERLIPIDEMIA: ICD-10-CM

## 2024-01-26 DIAGNOSIS — N18.30 STAGE 3 CHRONIC KIDNEY DISEASE, UNSPECIFIED WHETHER STAGE 3A OR 3B CKD: Primary | ICD-10-CM

## 2024-01-26 PROBLEM — E66.811 CLASS 1 OBESITY WITH ALVEOLAR HYPOVENTILATION, SERIOUS COMORBIDITY, AND BODY MASS INDEX (BMI) OF 34.0 TO 34.9 IN ADULT: Status: ACTIVE | Noted: 2024-01-26

## 2024-01-26 PROCEDURE — 99213 OFFICE O/P EST LOW 20 MIN: CPT | Mod: PBBFAC,PN | Performed by: INTERNAL MEDICINE

## 2024-01-26 PROCEDURE — 99213 OFFICE O/P EST LOW 20 MIN: CPT | Mod: S$PBB,,, | Performed by: INTERNAL MEDICINE

## 2024-01-26 PROCEDURE — 99999 PR PBB SHADOW E&M-EST. PATIENT-LVL III: CPT | Mod: PBBFAC,,, | Performed by: INTERNAL MEDICINE

## 2024-01-26 NOTE — PROGRESS NOTES
INTERNAL MEDICINE PROGRESS/URGENT CARE NOTE    CHIEF COMPLAINT     Chief Complaint   Patient presents with    Follow-up     6 mo f/u   Discuss obgyn  Discuss weight management   Discuss dementia        HPI     Leticia Trevino is a 68 y.o.  female who presents with a PMHx of  obesity with alveolar hypoventilation, HTN, HLD, ALIX, CKD 3a, who presents to clinic today for her 6-month followup.     Recently messaged to ask about having a PAP smear done. Today, she says she has scheduled with a gynecologist to have her well woman exam     Had some really good questions abut dementia and memory loss. Her sister has dementia a nd on hospice and she is understandably concerned about this.      Patient has done a wonderful and incredible job with her diet and has lost almost 20 pounds with diet and exercise. She has started seeing a nutritionist since her last visit and has sone such an amazing job with her weight loss. I couldn't be more proud of her and her efforts.         Her main complaints and concerns today: was concerned about dementia      ALIX: compliant with CPAP but now with the weight loss      CKD3a: normalized with hydration and diet      Obesity with alveolar hypoventilation: regarding her diet and exercise, she reports eating much better and doing weights for excercise         Home Medications:  Prior to Admission medications    Medication Sig Start Date End Date Taking? Authorizing Provider   clotrimazole-betamethasone 1-0.05% (LOTRISONE) cream Apply topically 2 (two) times a day. 3/4/22   Salome Strong MD   enalapril (VASOTEC) 2.5 MG tablet Take 1 tablet by mouth once daily 9/18/23   Goldie Abraham MD   FERROUS FUMARATE/IRON PS CPLX (FERROUS FUMARATE-IRON PS CMPLX ORAL) Take 1 tablet by mouth twice a week.     Provider, Historical   triamcinolone acetonide 0.025% (KENALOG) 0.025 % cream APPLY  CREAM TOPICALLY TWICE DAILY 12/19/23   Goldie Abraham MD       Review of  "Systems:  Review of Systems        PHYSICAL EXAM     /66 (BP Location: Left arm, Patient Position: Sitting, BP Method: Medium (Manual))   Pulse 60   Temp 98.1 °F (36.7 °C) (Oral)   Resp 20   Ht 4' 11" (1.499 m)   Wt 80.8 kg (178 lb 2.1 oz)   SpO2 98%   BMI 35.98 kg/m²     GEN - A+OX4, NAD   HEENT - PERRL, EOMI, OP clear  Neck - No thyromegaly or cervical LAD. No thyroid masses felt.  CV - RRR, no m/r   Chest - CTAB, no wheezing or rhonchi  Abd - S/NT/ND/+BS.   Ext - 2+BDP and radial pulses. No C/C/E.  Skin - No rash.    LABS       ASSESSMENT/PLAN     Leticia Trevino is a 68 y.o. female with  1. Stage 3 chronic kidney disease, unspecified whether stage 3a or 3b CKD  Overview:  Improved with diet   Increase water intake       2. Primary insomnia  Overview:  ALIX on CPAP     Orders:  -     CBC Auto Differential; Future; Expected date: 01/26/2024    3. Class 1 obesity with alveolar hypoventilation, serious comorbidity, and body mass index (BMI) of 34.0 to 34.9 in adult  -     CBC Auto Differential; Future; Expected date: 01/26/2024    4. ALIX on CPAP  Overview:  07/18/2022, 07/19/2022 Home Sleep Study    2 night study  MILD/BORDERLINE OBSTRUCTIVE SLEEP APNEA with overall AHI 9.5/hr ( 53 events): night # 2  Oxygen desaturation: 84%. SpO2 between 85% to 89% for 3 min.  Patient snored 98% time above 50 .  Heart rate range: 44 bpm - 74 bpm  08/03/2022 obtained Resmed CPAP machine  On Auto CPAP 4-2 cm with optimal control AHI 0.5  Nasal mask  HME: [facility]   11/21/2022 Overnight oximetry on room air on APAP 4-20 cm results pending    Orders:  -     CBC Auto Differential; Future; Expected date: 01/26/2024    5. Mixed hyperlipidemia  Overview:  Cholesterol improved with wieght loss     Orders:  -     Lipid Panel; Future; Expected date: 01/26/2024    6. Scoliosis, unspecified scoliosis type, unspecified spinal region      7. Primary hypertension  Overview:  BP very wlel controlled but has improved with her 20 " pound weileo loss through diet and exercise  Continue wonderful work  Limit salt in diet     Orders:  -     Comprehensive Metabolic Panel; Future; Expected date: 01/26/2024  -     TSH; Future; Expected date: 01/26/2024           WORRY SCORE 1    RTC in 6 months, sooner if needed and depending on labs.    Goldie Abraham MD  Board Certified Internist/Geriatrician  [facility] ([place])

## 2024-01-29 ENCOUNTER — TELEPHONE (OUTPATIENT)
Dept: PULMONOLOGY | Facility: CLINIC | Age: 69
End: 2024-01-29

## 2024-01-29 ENCOUNTER — PATIENT MESSAGE (OUTPATIENT)
Dept: PULMONOLOGY | Facility: CLINIC | Age: 69
End: 2024-01-29

## 2024-01-29 NOTE — TELEPHONE ENCOUNTER
Attempted to room patient but she refused to stand on scale and stated she did not want to see the provider. Patient stated she was only coming for a card download not an appointment. Staff informed provider of this and patient's appointment was unchecked for arrival.

## 2024-04-03 ENCOUNTER — PATIENT MESSAGE (OUTPATIENT)
Dept: PULMONOLOGY | Facility: CLINIC | Age: 69
End: 2024-04-03
Payer: MEDICARE

## 2024-04-05 DIAGNOSIS — R23.8 SKIN IRRITATION: ICD-10-CM

## 2024-04-08 RX ORDER — TRIAMCINOLONE ACETONIDE 0.25 MG/G
CREAM TOPICAL 2 TIMES DAILY
Qty: 15 G | Refills: 0 | Status: SHIPPED | OUTPATIENT
Start: 2024-04-08

## 2024-04-23 DIAGNOSIS — B37.2 YEAST INFECTION OF THE SKIN: Primary | ICD-10-CM

## 2024-04-24 NOTE — TELEPHONE ENCOUNTER
"  [place] - Med Surg (3rd Fl)  Adult Nutrition  Consult Note    SUMMARY     Recommendations    Recommendation:   1. Diabetic diet appropriate   2. Encourage adequate intake of meals as needed   3. If appropriate consider Boost Glucose with meals    Goals: pt to meet at least 85% EEN by f/u  Nutrition Goal Status: new  Communication of RD Recs:  (POC)    Assessment and Plan  Nutrition Problem:  Unintended weight loss    Related to (etiology):   Decreased appetite    Signs and Symptoms (as evidenced by):   62lb wt loss in 6 months    Interventions:  Carbohydrate modified diet    Nutrition Diagnosis Status:   New    Malnutrition Assessment  27% wt loss in 6 months if recorded weight is accurate    Reason for Assessment    Reason For Assessment: consult  Diagnosis:  (Acute cystitis with hematuria)  Relevant Medical History: DM 2, NAFLD, HLD, CVA, HTN, depression, and CNS vasculitis-MS    General Information Comments: Consult received for debility. Pt set to d/c back to NH. Pt has been consuming 50% of meals with minimal feeding assisstance per flowsheets. Per chart review, 62 lb weight loss in 6 months. Will follow-up if appropriate.    Nutrition Discharge Planning: Diabetic diet    Nutrition Risk Screen    Nutrition Risk Screen: no indicators present   Factors Affecting Nutritional intake: decreased appetite    Nutrition/Diet History    Patient Reported Diet/Restrictions/Preferences: diabetic diet  Spiritual, Cultural Beliefs, Taoist Practices, Values that Affect Care: no  Food Allergies: NKFA    Anthropometrics    Temp: 97.7 °F (36.5 °C)  Height Method: Stated  Height: 5' 8" (172.7 cm)  Height (inches): 68 in  Weight Method: Bed Scale  Weight: 76 kg (167 lb 8.8 oz)  Weight (lb): 167.55 lb  Ideal Body Weight (IBW), Male: 154 lb  % Ideal Body Weight, Male (lb): 108.8 %  BMI (Calculated): 25.5  BMI Grade: 25 - 29.9 - overweight       Lab/Procedures/Meds    Pertinent Labs Reviewed: reviewed  Pertinent Labs Comments: " Left message for pt requesting a call back at earliest convenience.      BUN 42, eGFR 53, glucose 300, protein total 5.9, albumin 2.7  Pertinent Medications Reviewed: reviewed  Pertinent Medications Comments: vit C, atorvastatin, carvedilol, enoxaparin, ferrous sulfate, pantoprazole, polyethylene glycol, vit D    Estimated/Assessed Needs    Weight Used For Calorie Calculations: 76 kg (167 lb 8.8 oz)  Energy Calorie Requirements (kcal): 1905  Energy Need Method: [place]-St Jeor (*1.25)  Protein Requirements: 60-76 g (.8-1 g/kg)  Weight Used For Protein Calculations: 76 kg (167 lb 8.8 oz)     Estimated Fluid Requirement Method: RDA Method  RDA Method (mL): 1905  CHO Requirement: 238 g      Nutrition Prescription Ordered    Current Diet Order: Diabetic Diet    Evaluation of Received Nutrient/Fluid Intake    Fluid Required: meeting needs  % Intake of Estimated Energy Needs: 50 - 75 %  % Meal Intake: 50%    Nutrition Risk    Level of Risk/Frequency of Follow-up: low - moderate       Monitor and Evaluation    Food and Nutrient Intake: energy intake, food and beverage intake  Food and Nutrient Adminstration: diet order  Physical Activity and Function: nutrition-related ADLs and IADLs  Anthropometric Measurements: weight, weight change, body mass index  Biochemical Data, Medical Tests and Procedures: electrolyte and renal panel, glucose/endocrine profile, lipid profile  Nutrition-Focused Physical Findings: overall appearance       Nutrition Follow-Up    RD Follow-up?: Yes

## 2024-04-25 RX ORDER — CLOTRIMAZOLE AND BETAMETHASONE DIPROPIONATE 10; .64 MG/G; MG/G
CREAM TOPICAL 2 TIMES DAILY
Qty: 15 G | Refills: 0 | Status: SHIPPED | OUTPATIENT
Start: 2024-04-25

## 2024-05-31 ENCOUNTER — HOSPITAL ENCOUNTER (OUTPATIENT)
Dept: RADIOLOGY | Facility: HOSPITAL | Age: 69
Discharge: HOME | End: 2024-05-31
Attending: INTERNAL MEDICINE
Payer: MEDICARE

## 2024-05-31 VITALS — BODY MASS INDEX: 35.95 KG/M2 | WEIGHT: 178 LBS

## 2024-05-31 DIAGNOSIS — Z12.31 ENCOUNTER FOR SCREENING MAMMOGRAM FOR BREAST CANCER: ICD-10-CM

## 2024-05-31 PROCEDURE — 77063 BREAST TOMOSYNTHESIS BI: CPT | Mod: TC,PO

## 2024-05-31 PROCEDURE — 77063 BREAST TOMOSYNTHESIS BI: CPT | Mod: 26,,, | Performed by: RADIOLOGY

## 2024-05-31 PROCEDURE — 77067 SCR MAMMO BI INCL CAD: CPT | Mod: 26,,, | Performed by: RADIOLOGY

## 2024-06-13 ENCOUNTER — TELEPHONE (OUTPATIENT)
Dept: FAMILY MEDICINE | Facility: CLINIC | Age: 69
End: 2024-06-13
Payer: MEDICARE

## 2024-07-02 ENCOUNTER — LAB VISIT (OUTPATIENT)
Dept: LAB | Facility: HOSPITAL | Age: 69
End: 2024-07-02
Attending: INTERNAL MEDICINE
Payer: MEDICARE

## 2024-07-02 ENCOUNTER — PATIENT MESSAGE (OUTPATIENT)
Dept: PRIMARY CARE CLINIC | Facility: CLINIC | Age: 69
End: 2024-07-02
Payer: MEDICARE

## 2024-07-02 DIAGNOSIS — F51.01 PRIMARY INSOMNIA: ICD-10-CM

## 2024-07-02 DIAGNOSIS — E66.2 CLASS 1 OBESITY WITH ALVEOLAR HYPOVENTILATION, SERIOUS COMORBIDITY, AND BODY MASS INDEX (BMI) OF 34.0 TO 34.9 IN ADULT: ICD-10-CM

## 2024-07-02 DIAGNOSIS — I10 PRIMARY HYPERTENSION: ICD-10-CM

## 2024-07-02 DIAGNOSIS — G47.33 OSA ON CPAP: ICD-10-CM

## 2024-07-02 DIAGNOSIS — E78.2 MIXED HYPERLIPIDEMIA: ICD-10-CM

## 2024-07-02 LAB
ALBUMIN SERPL BCP-MCNC: 3.8 G/DL (ref 3.5–5.2)
ALP SERPL-CCNC: 43 U/L (ref 55–135)
ALT SERPL W/O P-5'-P-CCNC: 17 U/L (ref 10–44)
ANION GAP SERPL CALC-SCNC: 10 MMOL/L (ref 8–16)
AST SERPL-CCNC: 20 U/L (ref 10–40)
BASOPHILS # BLD AUTO: 0.03 K/UL (ref 0–0.2)
BASOPHILS NFR BLD: 0.5 % (ref 0–1.9)
BILIRUB SERPL-MCNC: 0.5 MG/DL (ref 0.1–1)
BUN SERPL-MCNC: 23 MG/DL (ref 8–23)
CALCIUM SERPL-MCNC: 9.7 MG/DL (ref 8.7–10.5)
CHLORIDE SERPL-SCNC: 112 MMOL/L (ref 95–110)
CHOLEST SERPL-MCNC: 191 MG/DL (ref 120–199)
CHOLEST/HDLC SERPL: 2.8 {RATIO} (ref 2–5)
CO2 SERPL-SCNC: 21 MMOL/L (ref 23–29)
CREAT SERPL-MCNC: 1.1 MG/DL (ref 0.5–1.4)
DIFFERENTIAL METHOD BLD: NORMAL
EOSINOPHIL # BLD AUTO: 0.1 K/UL (ref 0–0.5)
EOSINOPHIL NFR BLD: 2 % (ref 0–8)
ERYTHROCYTE [DISTWIDTH] IN BLOOD BY AUTOMATED COUNT: 14.4 % (ref 11.5–14.5)
EST. GFR  (NO RACE VARIABLE): 54.7 ML/MIN/1.73 M^2
GLUCOSE SERPL-MCNC: 94 MG/DL (ref 70–110)
HCT VFR BLD AUTO: 40 % (ref 37–48.5)
HDLC SERPL-MCNC: 69 MG/DL (ref 40–75)
HDLC SERPL: 36.1 % (ref 20–50)
HGB BLD-MCNC: 12.8 G/DL (ref 12–16)
IMM GRANULOCYTES # BLD AUTO: 0.01 K/UL (ref 0–0.04)
IMM GRANULOCYTES NFR BLD AUTO: 0.2 % (ref 0–0.5)
LDLC SERPL CALC-MCNC: 113.4 MG/DL (ref 63–159)
LYMPHOCYTES # BLD AUTO: 1.2 K/UL (ref 1–4.8)
LYMPHOCYTES NFR BLD: 19.5 % (ref 18–48)
MCH RBC QN AUTO: 29.4 PG (ref 27–31)
MCHC RBC AUTO-ENTMCNC: 32 G/DL (ref 32–36)
MCV RBC AUTO: 92 FL (ref 82–98)
MONOCYTES # BLD AUTO: 0.4 K/UL (ref 0.3–1)
MONOCYTES NFR BLD: 7.3 % (ref 4–15)
NEUTROPHILS # BLD AUTO: 4.2 K/UL (ref 1.8–7.7)
NEUTROPHILS NFR BLD: 70.5 % (ref 38–73)
NONHDLC SERPL-MCNC: 122 MG/DL
NRBC BLD-RTO: 0 /100 WBC
PLATELET # BLD AUTO: 207 K/UL (ref 150–450)
PMV BLD AUTO: 11.1 FL (ref 9.2–12.9)
POTASSIUM SERPL-SCNC: 4.2 MMOL/L (ref 3.5–5.1)
PROT SERPL-MCNC: 6.8 G/DL (ref 6–8.4)
RBC # BLD AUTO: 4.36 M/UL (ref 4–5.4)
SODIUM SERPL-SCNC: 143 MMOL/L (ref 136–145)
TRIGL SERPL-MCNC: 43 MG/DL (ref 30–150)
TSH SERPL DL<=0.005 MIU/L-ACNC: 1.56 UIU/ML (ref 0.4–4)
WBC # BLD AUTO: 5.9 K/UL (ref 3.9–12.7)

## 2024-07-02 PROCEDURE — 80053 COMPREHEN METABOLIC PANEL: CPT | Performed by: INTERNAL MEDICINE

## 2024-07-02 PROCEDURE — 36415 COLL VENOUS BLD VENIPUNCTURE: CPT | Mod: PO | Performed by: INTERNAL MEDICINE

## 2024-07-02 PROCEDURE — 80061 LIPID PANEL: CPT | Performed by: INTERNAL MEDICINE

## 2024-07-02 PROCEDURE — 85025 COMPLETE CBC W/AUTO DIFF WBC: CPT | Performed by: INTERNAL MEDICINE

## 2024-07-02 PROCEDURE — 84443 ASSAY THYROID STIM HORMONE: CPT | Performed by: INTERNAL MEDICINE

## 2024-07-03 NOTE — PROGRESS NOTES
Labs reviewed. Will discuss further at upcoming appointment. Reduction in renal function. I advise increasing fluid intake

## 2024-07-19 ENCOUNTER — OFFICE VISIT (OUTPATIENT)
Dept: PRIMARY CARE CLINIC | Facility: CLINIC | Age: 69
End: 2024-07-19
Payer: MEDICARE

## 2024-07-19 VITALS
HEART RATE: 67 BPM | BODY MASS INDEX: 37.11 KG/M2 | DIASTOLIC BLOOD PRESSURE: 78 MMHG | HEIGHT: 59 IN | SYSTOLIC BLOOD PRESSURE: 124 MMHG | OXYGEN SATURATION: 96 % | WEIGHT: 184.06 LBS

## 2024-07-19 DIAGNOSIS — R63.4 WEIGHT LOSS: ICD-10-CM

## 2024-07-19 DIAGNOSIS — G47.33 OSA ON CPAP: ICD-10-CM

## 2024-07-19 DIAGNOSIS — F51.01 PRIMARY INSOMNIA: ICD-10-CM

## 2024-07-19 DIAGNOSIS — E66.2 CLASS 1 OBESITY WITH ALVEOLAR HYPOVENTILATION, SERIOUS COMORBIDITY, AND BODY MASS INDEX (BMI) OF 34.0 TO 34.9 IN ADULT: ICD-10-CM

## 2024-07-19 DIAGNOSIS — E78.2 MIXED HYPERLIPIDEMIA: ICD-10-CM

## 2024-07-19 DIAGNOSIS — Z78.0 MENOPAUSE: ICD-10-CM

## 2024-07-19 DIAGNOSIS — N18.30 STAGE 3 CHRONIC KIDNEY DISEASE, UNSPECIFIED WHETHER STAGE 3A OR 3B CKD: Primary | ICD-10-CM

## 2024-07-19 DIAGNOSIS — I10 PRIMARY HYPERTENSION: ICD-10-CM

## 2024-07-19 PROCEDURE — 99213 OFFICE O/P EST LOW 20 MIN: CPT | Mod: PBBFAC,PN | Performed by: INTERNAL MEDICINE

## 2024-07-19 PROCEDURE — 99999 PR PBB SHADOW E&M-EST. PATIENT-LVL III: CPT | Mod: PBBFAC,,, | Performed by: INTERNAL MEDICINE

## 2024-07-19 PROCEDURE — 99214 OFFICE O/P EST MOD 30 MIN: CPT | Mod: S$PBB,,, | Performed by: INTERNAL MEDICINE

## 2024-07-19 NOTE — PROGRESS NOTES
INTERNAL MEDICINE PROGRESS/URGENT CARE NOTE    CHIEF COMPLAINT     Chief Complaint   Patient presents with    Follow-up     Patient presents for her 6 month follow up appointment. Patient has no complaints at this time.       HPI     Leticia Trevino is a 68 y.o.  female who presents with a PMHx of  obesity with alveolar hypoventilation, HTN, HLD, ALIX, CKD 3a, who presents to clinic today for her 6-month followup.       Today, her concerns and complaints are:   Has lost two of her sister. One from alzheimers. One had a heart attack at 67. Her only sibling her brother had  TIA.      At her last visit, she Had some really good questions abut dementia and memory loss. Her sister has dementia a nd on hospice and she is understandably concerned about this.      Patient has done a wonderful and incredible job with her diet and has lost almost 20 pounds with diet and exercise. She has started seeing a nutritionist since her last visit and has sone such an amazing job with her weight loss. I couldn't be more proud of her and her efforts.      ALIX: compliant with CPAP but now with the weight loss      CKD3a: normalized with hydration and diet      Obesity with alveolar hypoventilation: regarding her diet and exercise, she reports eating much better and doing weights for excercise         Home Medications:  Prior to Admission medications    Medication Sig Start Date End Date Taking? Authorizing Provider   clotrimazole-betamethasone 1-0.05% (LOTRISONE) cream APPLY  CREAM TOPICALLY TWICE DAILY 4/25/24   Goldie Abraham MD   enalapril (VASOTEC) 2.5 MG tablet Take 1 tablet by mouth once daily 9/18/23   Goldie Abraham MD   FERROUS FUMARATE/IRON PS CPLX (FERROUS FUMARATE-IRON PS CMPLX ORAL) Take 1 tablet by mouth twice a week.     Provider, Historical   triamcinolone acetonide 0.025% (KENALOG) 0.025 % cream APPLY  CREAM TOPICALLY TO AFFECTED AREA TWICE DAILY 4/8/24   Goldie Abraham MD       Review of  "Systems:  Review of Systems      Advance Care Planning     Date: 07/19/2024    Power of   I initiated the process of voluntary advance care planning today and explained the importance of this process to the patient.  I introduced the concept of advance directives to the patient, as well. Then the patient received detailed information about the importance of designating a Health Care Power of  (HCPOA). She was also instructed to communicate with this person about their wishes for future healthcare, should she become sick and lose decision-making capacity. The patient has previously appointed a HCPOA. After our discussion, the patient has decided to complete a HCPOA and has appointed her  on file , health care agent:  on file  & health care agent number:  on file . I encouraged her to communicate with this person about their wishes for future healthcare, should she become sick and lose decision-making capacity.      A total of 10 min was spent on advance care planning, goals of care discussion, emotional support, formulating and communicating prognosis and exploring burden/benefit of various approaches of treatment. This discussion occurred on a fully voluntary basis with the verbal consent of the patient and/or family.             PHYSICAL EXAM     /78 (BP Location: Right arm, Patient Position: Sitting, BP Method: Large (Manual))   Pulse 67   Ht 4' 11" (1.499 m)   Wt 83.5 kg (184 lb 1.4 oz)   SpO2 96%   BMI 37.18 kg/m²     GEN - A+OX4, NAD   HEENT - PERRL, EOMI, OP clear  Neck - No thyromegaly or cervical LAD. No thyroid masses felt.  CV - RRR, no m/r   Chest - CTAB, no wheezing or rhonchi  Abd - S/NT/ND/+BS.   Ext - 2+BDP and radial pulses. No C/C/E.  Skin - No rash.    LABS         ASSESSMENT/PLAN     Leticia Trevino is a 69 y.o. female with  1. Stage 3 chronic kidney disease, unspecified whether stage 3a or 3b CKD  Decreased GFR. Encouraged increase hydration.   Avoid NSAIDs. "   Overview:  Improved with diet   Increase water intake     Orders:  -     Basic Metabolic Panel; Future; Expected date: 07/19/2024    2. Primary insomnia  Overview:  ALIX on CPAP       3. Class 1 obesity with alveolar hypoventilation, serious comorbidity, and body mass index (BMI) of 34.0 to 34.9 in adult  Working deligently on her wieghy loss    4. ALIX on CPAP  Overview:  07/18/2022, 07/19/2022 Home Sleep Study    2 night study  MILD/BORDERLINE OBSTRUCTIVE SLEEP APNEA with overall AHI 9.5/hr ( 53 events): night # 2  Oxygen desaturation: 84%. SpO2 between 85% to 89% for 3 min.  Patient snored 98% time above 50 .  Heart rate range: 44 bpm - 74 bpm  08/03/2022 obtained Resmed CPAP machine  On Auto CPAP 4-2 cm with optimal control AHI 0.5  Nasal mask  HME: [facility]   11/21/2022 Overnight oximetry on room air on APAP 4-20 cm results pending      5. Mixed hyperlipidemia  Overview:  Cholesterol improved with wieght loss       6. Primary hypertension  Overview:  BP very wlel controlled but has improved with her 20 pound weigt loss through diet and exercise  Continue wonderful work  Limit salt in diet       7. Weight loss  Overview:  Intentional. With the help of a nutritionist       8. Menopause  -     DXA Bone Density Axial Skeleton 1 or more sites; Future; Expected date: 07/19/2024           WORRY SCORE 6    RTC in 1 months, sooner if needed and depending on labs.    Goldie Abraham MD  Board Certified Internist/Geriatrician  [facility] ([place])

## 2024-07-25 ENCOUNTER — PATIENT MESSAGE (OUTPATIENT)
Dept: PRIMARY CARE CLINIC | Facility: CLINIC | Age: 69
End: 2024-07-25
Payer: MEDICARE

## 2024-07-26 ENCOUNTER — HOSPITAL ENCOUNTER (OUTPATIENT)
Dept: RADIOLOGY | Facility: HOSPITAL | Age: 69
Discharge: HOME | End: 2024-07-26
Attending: INTERNAL MEDICINE
Payer: MEDICARE

## 2024-07-26 DIAGNOSIS — Z78.0 MENOPAUSE: ICD-10-CM

## 2024-07-26 PROCEDURE — 77080 DXA BONE DENSITY AXIAL: CPT | Mod: 26,,, | Performed by: RADIOLOGY

## 2024-07-26 PROCEDURE — 77080 DXA BONE DENSITY AXIAL: CPT | Mod: TC,PO

## 2024-07-31 ENCOUNTER — TELEPHONE (OUTPATIENT)
Dept: PRIMARY CARE CLINIC | Facility: CLINIC | Age: 69
End: 2024-07-31
Payer: MEDICARE

## 2024-07-31 ENCOUNTER — PATIENT MESSAGE (OUTPATIENT)
Dept: PRIMARY CARE CLINIC | Facility: CLINIC | Age: 69
End: 2024-07-31
Payer: MEDICARE

## 2024-07-31 NOTE — LETTER
July 31, 2024    Leticia CARRENO Uriel  [address]  [place] [address]             [facility] [place]  [address]  [facility]  [facility] [address]  Phone: [phone]  Fax: [phone] Dear Ms. Trevino:    Your bone density showed no loss of density.       If you have any questions or concerns, please don't hesitate to call [phone].    Sincerely,      Lincoln Ballard RN

## 2024-08-07 ENCOUNTER — TELEPHONE (OUTPATIENT)
Dept: FAMILY MEDICINE | Facility: CLINIC | Age: 69
End: 2024-08-07
Payer: MEDICARE

## 2024-08-09 ENCOUNTER — TELEPHONE (OUTPATIENT)
Dept: FAMILY MEDICINE | Facility: CLINIC | Age: 69
End: 2024-08-09
Payer: MEDICARE

## 2024-09-07 DIAGNOSIS — I10 PRIMARY HYPERTENSION: Primary | ICD-10-CM

## 2024-09-09 RX ORDER — ENALAPRIL MALEATE 2.5 MG/1
TABLET ORAL
Qty: 90 TABLET | Refills: 1 | Status: SHIPPED | OUTPATIENT
Start: 2024-09-09

## 2024-09-27 DIAGNOSIS — R23.8 SKIN IRRITATION: ICD-10-CM

## 2024-09-27 DIAGNOSIS — B37.2 YEAST INFECTION OF THE SKIN: ICD-10-CM

## 2024-09-27 NOTE — TELEPHONE ENCOUNTER
LM for patient to return call regarding need for refills. Initial Rx was for skin irritation S/P yeast infection, is this still happening?

## 2024-09-30 RX ORDER — TRIAMCINOLONE ACETONIDE 0.25 MG/G
CREAM TOPICAL 2 TIMES DAILY
Qty: 15 G | Refills: 0 | Status: SHIPPED | OUTPATIENT
Start: 2024-09-30

## 2024-09-30 RX ORDER — CLOTRIMAZOLE AND BETAMETHASONE DIPROPIONATE 10; .64 MG/G; MG/G
CREAM TOPICAL 2 TIMES DAILY
Qty: 45 G | Refills: 0 | Status: SHIPPED | OUTPATIENT
Start: 2024-09-30

## 2024-10-02 ENCOUNTER — TELEPHONE (OUTPATIENT)
Dept: FAMILY MEDICINE | Facility: CLINIC | Age: 69
End: 2024-10-02
Payer: MEDICARE

## 2024-10-02 NOTE — TELEPHONE ENCOUNTER
Called to reschedule eAWV on 10/18/24 - [facility] to return call to [phone]

## 2024-10-16 ENCOUNTER — PATIENT MESSAGE (OUTPATIENT)
Dept: PRIMARY CARE CLINIC | Facility: CLINIC | Age: 69
End: 2024-10-16
Payer: MEDICARE

## 2024-10-21 ENCOUNTER — PATIENT MESSAGE (OUTPATIENT)
Dept: PRIMARY CARE CLINIC | Facility: CLINIC | Age: 69
End: 2024-10-21
Payer: MEDICARE

## 2024-11-12 ENCOUNTER — PATIENT MESSAGE (OUTPATIENT)
Dept: PRIMARY CARE CLINIC | Facility: CLINIC | Age: 69
End: 2024-11-12
Payer: MEDICARE

## 2024-11-22 ENCOUNTER — OFFICE VISIT (OUTPATIENT)
Dept: FAMILY MEDICINE | Facility: CLINIC | Age: 69
End: 2024-11-22
Payer: MEDICARE

## 2024-11-22 VITALS
SYSTOLIC BLOOD PRESSURE: 124 MMHG | DIASTOLIC BLOOD PRESSURE: 72 MMHG | OXYGEN SATURATION: 97 % | HEART RATE: 73 BPM | BODY MASS INDEX: 39.2 KG/M2 | HEIGHT: 59 IN | WEIGHT: 194.44 LBS

## 2024-11-22 DIAGNOSIS — G47.33 OSA ON CPAP: ICD-10-CM

## 2024-11-22 DIAGNOSIS — E78.2 MIXED HYPERLIPIDEMIA: ICD-10-CM

## 2024-11-22 DIAGNOSIS — E66.01 SEVERE OBESITY (BMI 35.0-39.9) WITH COMORBIDITY: ICD-10-CM

## 2024-11-22 DIAGNOSIS — I10 PRIMARY HYPERTENSION: ICD-10-CM

## 2024-11-22 DIAGNOSIS — N18.30 STAGE 3 CHRONIC KIDNEY DISEASE, UNSPECIFIED WHETHER STAGE 3A OR 3B CKD: ICD-10-CM

## 2024-11-22 DIAGNOSIS — Z00.00 ENCOUNTER FOR PREVENTIVE HEALTH EXAMINATION: Primary | ICD-10-CM

## 2024-11-22 PROBLEM — E66.2 CLASS 1 OBESITY WITH ALVEOLAR HYPOVENTILATION, SERIOUS COMORBIDITY, AND BODY MASS INDEX (BMI) OF 34.0 TO 34.9 IN ADULT: Status: RESOLVED | Noted: 2024-01-26 | Resolved: 2024-11-22

## 2024-11-22 PROBLEM — E66.811 CLASS 1 OBESITY WITH ALVEOLAR HYPOVENTILATION, SERIOUS COMORBIDITY, AND BODY MASS INDEX (BMI) OF 34.0 TO 34.9 IN ADULT: Status: RESOLVED | Noted: 2024-01-26 | Resolved: 2024-11-22

## 2024-11-22 PROBLEM — R79.89 POSITIVE D DIMER: Status: RESOLVED | Noted: 2022-12-02 | Resolved: 2024-11-22

## 2024-11-22 PROCEDURE — 99999 PR PBB SHADOW E&M-EST. PATIENT-LVL III: CPT | Mod: PBBFAC,,, | Performed by: NURSE PRACTITIONER

## 2024-11-22 PROCEDURE — 99213 OFFICE O/P EST LOW 20 MIN: CPT | Mod: PBBFAC,PO | Performed by: NURSE PRACTITIONER

## 2024-11-22 NOTE — PROGRESS NOTES
"  Leticia Trevino presented for an initial Medicare AWV today. The following components were reviewed and updated:    Medical history  Family History  Social history  Allergies and Current Medications  Health Risk Assessment  Health Maintenance  Care Team    **See Completed Assessments for Annual Wellness visit with in the encounter summary    The following assessments were completed:  Depression Screening  Cognitive function Screening    Timed Get Up Test  Whisper Test    Opioid documentation:  Patient does not have a current opioid prescription.        Vitals:    11/22/24 1124   BP: 124/72   BP Location: Left arm   Patient Position: Sitting   Pulse: 73   SpO2: 97%   Weight: 88.2 kg (194 lb 7.1 oz)   Height: 4' 11" (1.499 m)     Body mass index is 39.27 kg/m².     Physical Exam  Vitals reviewed.   Constitutional:       General: She is not in acute distress.  Pulmonary:      Effort: Pulmonary effort is normal. No respiratory distress.   Neurological:      Mental Status: She is alert and oriented to person, place, and time.   Psychiatric:         Mood and Affect: Mood normal.         Behavior: Behavior normal.         Thought Content: Thought content normal.         Judgment: Judgment normal.     Diagnoses and health risks identified today and associated recommendations/orders:  1. Encounter for preventive health examination  Reviewed and discussed health maintenance.      2. Mixed hyperlipidemia  Stable- continue current treatment and follow up routinely with PCP   Encouraged healthy eating, weight loss and routine exercise   Lab Results   Component Value Date    CHOL 191 07/02/2024    CHOL 197 05/26/2023    CHOL 230 (H) 05/06/2022     Lab Results   Component Value Date    HDL 69 07/02/2024    HDL 61 05/26/2023    HDL 73 05/06/2022     Lab Results   Component Value Date    LDLCALC 113.4 07/02/2024    LDLCALC 125.6 05/26/2023    LDLCALC 143.0 05/06/2022     Lab Results   Component Value Date    TRIG 43 07/02/2024    " TRIG 52 05/26/2023    TRIG 70 05/06/2022       Lab Results   Component Value Date    CHOLHDL 36.1 07/02/2024    CHOLHDL 31.0 05/26/2023    CHOLHDL 31.7 05/06/2022        3. Stage 3 chronic kidney disease, unspecified whether stage 3a or 3b CKD  Stable- continue current treatment and follow up routinely with PCP   Avoid NSAIDs and increase fluids  BMP  Lab Results   Component Value Date     07/02/2024    K 4.2 07/02/2024     (H) 07/02/2024    CO2 21 (L) 07/02/2024    BUN 23 07/02/2024    CREATININE 1.1 07/02/2024    CALCIUM 9.7 07/02/2024    ANIONGAP 10 07/02/2024    EGFRNORACEVR 54.7 (A) 07/02/2024      4. Primary hypertension  Stable- continue current treatment and follow up routinely with PCP   Vasotec 2.5mg daily   Vitals:    11/22/24 1124   BP: 124/72   Pulse: 73       5. Severe obesity (BMI 35.0-39.9) with comorbidity  Was seeing a nutritionist and lost weight but has recently gained most back. She plans on changes her lifestyle and make better/healthy choices.   She will be starting back at work and have a better routine. She plans to be more active  Follow up weight in January with pcp    6. ALIX on CPAP  Followed by pulmonary at [facility] ()  She reports that she has been taken off the cpap    Provided Leticia with a 5-10 year written screening schedule and personal prevention plan. Recommendations were developed using the USPSTF age appropriate recommendations. Education, counseling, and referrals were provided as needed.  After Visit Summary printed and given to patient which includes a list of additional screenings\tests needed.    I offered to discuss advanced care planning, including how to pick a person who would make decisions for you if you were unable to make them for yourself, called a health care power of , and what kind of decisions you might make such as use of life sustaining treatments such as ventilators and tube feeding when faced with a life limiting illness  recorded on a living will that they will need to know. (How you want to be cared for as you near the end of your natural life)   X  Patient has advanced directives written and agrees to provide copies to the institution.  Libertad Hayden, NP

## 2024-11-22 NOTE — PATIENT INSTRUCTIONS
Counseling and Referral of Other Preventative  (Italic type indicates deductible and co-insurance are waived)    Patient Name: Leticia Trevino  Today's Date: 11/22/2024    Health Maintenance       Date Due Completion Date    Annual UACr Never done ---    Colorectal Cancer Screening 02/24/2025 2/24/2020    Hemoglobin A1c (Diabetic Prevention Screening) 05/06/2025 5/6/2022    Mammogram 05/31/2025 5/31/2024    Lipid Panel 07/02/2025 7/2/2024    DEXA Scan 07/26/2027 7/26/2024    TETANUS VACCINE 09/01/2033 9/1/2023        No orders of the defined types were placed in this encounter.    The following information is provided to all patients.  This information is to help you find resources for any of the problems found today that may be affecting your health:                  Living healthy guide: www.[facility].[place].[facility]      Understanding Diabetes: www.diabetes.org      Eating healthy: www.cdc.gov/healthyweight      [facility] home safety checklist: www.cdc.gov/steadi/patient.html      Agency on Aging: www.goea.[place]      Alcoholics anonymous (AA): www.aa.org      Physical Activity: www.al.nih.gov/sa2razd      Tobacco use: www.quitwithusla.org

## 2025-01-13 ENCOUNTER — LAB VISIT (OUTPATIENT)
Dept: LAB | Facility: HOSPITAL | Age: 70
End: 2025-01-13
Attending: INTERNAL MEDICINE
Payer: MEDICARE

## 2025-01-13 DIAGNOSIS — N18.30 STAGE 3 CHRONIC KIDNEY DISEASE, UNSPECIFIED WHETHER STAGE 3A OR 3B CKD: ICD-10-CM

## 2025-01-13 LAB
ANION GAP SERPL CALC-SCNC: 8 MMOL/L (ref 8–16)
BUN SERPL-MCNC: 20 MG/DL (ref 8–23)
CALCIUM SERPL-MCNC: 9.4 MG/DL (ref 8.7–10.5)
CHLORIDE SERPL-SCNC: 108 MMOL/L (ref 95–110)
CO2 SERPL-SCNC: 25 MMOL/L (ref 23–29)
CREAT SERPL-MCNC: 1.1 MG/DL (ref 0.5–1.4)
EST. GFR  (NO RACE VARIABLE): 54.7 ML/MIN/1.73 M^2
GLUCOSE SERPL-MCNC: 92 MG/DL (ref 70–110)
POTASSIUM SERPL-SCNC: 4.6 MMOL/L (ref 3.5–5.1)
SODIUM SERPL-SCNC: 141 MMOL/L (ref 136–145)

## 2025-01-13 PROCEDURE — 36415 COLL VENOUS BLD VENIPUNCTURE: CPT | Mod: PO | Performed by: INTERNAL MEDICINE

## 2025-01-13 PROCEDURE — 80048 BASIC METABOLIC PNL TOTAL CA: CPT | Performed by: INTERNAL MEDICINE

## 2025-01-17 ENCOUNTER — OFFICE VISIT (OUTPATIENT)
Dept: PRIMARY CARE CLINIC | Facility: CLINIC | Age: 70
End: 2025-01-17
Payer: MEDICARE

## 2025-01-17 VITALS
OXYGEN SATURATION: 98 % | DIASTOLIC BLOOD PRESSURE: 78 MMHG | WEIGHT: 190.81 LBS | HEART RATE: 81 BPM | SYSTOLIC BLOOD PRESSURE: 120 MMHG | BODY MASS INDEX: 38.47 KG/M2 | HEIGHT: 59 IN

## 2025-01-17 DIAGNOSIS — Z12.12 SCREENING FOR COLORECTAL CANCER: ICD-10-CM

## 2025-01-17 DIAGNOSIS — E66.01 SEVERE OBESITY (BMI 35.0-39.9) WITH COMORBIDITY: ICD-10-CM

## 2025-01-17 DIAGNOSIS — G47.33 OSA ON CPAP: ICD-10-CM

## 2025-01-17 DIAGNOSIS — R73.09 ELEVATED GLUCOSE: ICD-10-CM

## 2025-01-17 DIAGNOSIS — Z12.11 SCREENING FOR COLORECTAL CANCER: ICD-10-CM

## 2025-01-17 DIAGNOSIS — N18.31 CHRONIC KIDNEY DISEASE, STAGE 3A: Primary | ICD-10-CM

## 2025-01-17 DIAGNOSIS — I10 PRIMARY HYPERTENSION: ICD-10-CM

## 2025-01-17 DIAGNOSIS — F51.01 PRIMARY INSOMNIA: ICD-10-CM

## 2025-01-17 DIAGNOSIS — E78.2 MIXED HYPERLIPIDEMIA: ICD-10-CM

## 2025-01-17 PROCEDURE — 99214 OFFICE O/P EST MOD 30 MIN: CPT | Mod: S$GLB,,, | Performed by: INTERNAL MEDICINE

## 2025-01-17 PROCEDURE — 1160F RVW MEDS BY RX/DR IN RCRD: CPT | Mod: CPTII,S$GLB,, | Performed by: INTERNAL MEDICINE

## 2025-01-17 PROCEDURE — 3078F DIAST BP <80 MM HG: CPT | Mod: CPTII,S$GLB,, | Performed by: INTERNAL MEDICINE

## 2025-01-17 PROCEDURE — 3074F SYST BP LT 130 MM HG: CPT | Mod: CPTII,S$GLB,, | Performed by: INTERNAL MEDICINE

## 2025-01-17 PROCEDURE — 1126F AMNT PAIN NOTED NONE PRSNT: CPT | Mod: CPTII,S$GLB,, | Performed by: INTERNAL MEDICINE

## 2025-01-17 PROCEDURE — 1158F ADVNC CARE PLAN TLK DOCD: CPT | Mod: CPTII,S$GLB,, | Performed by: INTERNAL MEDICINE

## 2025-01-17 PROCEDURE — 1101F PT FALLS ASSESS-DOCD LE1/YR: CPT | Mod: CPTII,S$GLB,, | Performed by: INTERNAL MEDICINE

## 2025-01-17 PROCEDURE — 3008F BODY MASS INDEX DOCD: CPT | Mod: CPTII,S$GLB,, | Performed by: INTERNAL MEDICINE

## 2025-01-17 PROCEDURE — 99999 PR PBB SHADOW E&M-EST. PATIENT-LVL III: CPT | Mod: PBBFAC,,, | Performed by: INTERNAL MEDICINE

## 2025-01-17 PROCEDURE — 3288F FALL RISK ASSESSMENT DOCD: CPT | Mod: CPTII,S$GLB,, | Performed by: INTERNAL MEDICINE

## 2025-01-17 PROCEDURE — 1159F MED LIST DOCD IN RCRD: CPT | Mod: CPTII,S$GLB,, | Performed by: INTERNAL MEDICINE

## 2025-01-17 NOTE — PROGRESS NOTES
INTERNAL MEDICINE PROGRESS/URGENT CARE NOTE    CHIEF COMPLAINT     Chief Complaint   Patient presents with    Follow-up     Review labs       HPI     Leticia Trevino is a 69 y.o.  female who presents with a PMHx of  obesity with alveolar hypoventilation, HTN, HLD, ALIX, CKD 3a, who presents to clinic today for her 6-month followup.       Patient has done a wonderful and incredible job with her diet and has lost almost 20 pounds with diet and exercise. She has started seeing a nutritionist since her last visit and has sone such an amazing job with her weight loss. I couldn't be more proud of her and her efforts.     Today, her concerns and complaints are:   At her annual wellness exam, was told that she has hearing loss.     At her previous visits, we have discussed:   Has lost two of her sister. One from alzheimers. One had a heart attack at 67. Her only sibling her brother had  TIA.    Had some really good questions abut dementia and memory loss. Her sister has dementia a nd on hospice and she is understandably concerned about this.      ALIX: compliant with CPAP but now with the weight loss      CKD3a: normalized with hydration and diet      Obesity with alveolar hypoventilation: regarding her diet and exercise, she reports eating much better and doing weights for excercise      Home Medications:  Prior to Admission medications    Medication Sig Start Date End Date Taking? Authorizing Provider   clotrimazole-betamethasone 1-0.05% (LOTRISONE) cream APPLY  CREAM TOPICALLY TWICE DAILY 9/30/24   Leticia Perales MD   enalapril (VASOTEC) 2.5 MG tablet Take 1 tablet by mouth once daily 9/9/24   Goldie Abraham MD   FERROUS FUMARATE/IRON PS CPLX (FERROUS FUMARATE-IRON PS CMPLX ORAL) Take 1 tablet by mouth twice a week.     Provider, Historical   triamcinolone acetonide 0.025% (KENALOG) 0.025 % cream APPLY  CREAM TOPICALLY TO AFFECTED AREA TWICE DAILY 9/30/24   Leticia Perales MD       Review of Systems:  Review of  "Systems      Advance Care Planning     Date: 01/17/2025    Power of   I initiated the process of voluntary advance care planning today and explained the importance of this process to the patient.  I introduced the concept of advance directives to the patient, as well. Then the patient received detailed information about the importance of designating a Health Care Power of  (HCPOA). She was also instructed to communicate with this person about their wishes for future healthcare, should she become sick and lose decision-making capacity. The patient has previously appointed a HCPOA. After our discussion, the patient has decided to complete a HCPOA and has appointed her daughter, health care agent:  on file  & health care agent number:  on file . I encouraged her to communicate with this person about their wishes for future healthcare, should she become sick and lose decision-making capacity.      A total of 10 min was spent on advance care planning, goals of care discussion, emotional support, formulating and communicating prognosis and exploring burden/benefit of various approaches of treatment. This discussion occurred on a fully voluntary basis with the verbal consent of the patient and/or family.             PHYSICAL EXAM     /78 (BP Location: Right arm, Patient Position: Sitting)   Pulse 81   Ht 4' 11" (1.499 m)   Wt 86.5 kg (190 lb 12.9 oz)   SpO2 98%   BMI 38.54 kg/m²     GEN - A+OX4, NAD   HEENT - PERRL, EOMI, OP clear  Neck - No thyromegaly or cervical LAD. No thyroid masses felt.  CV - RRR, no m/r   Chest - CTAB, no wheezing or rhonchi  Abd - S/NT/ND/+BS.   Ext - 2+BDP and radial pulses. No C/C/E.  Skin - No rash.    LABS         ASSESSMENT/PLAN     Leticia Trevino is a 69 y.o. female with  1. Chronic kidney disease, stage 3a  Stable renal function  Does not take any NSAIDs  Hydrates well     2. Severe obesity (BMI 35.0-39.9) with comorbidity  Having some trouble with wieght loss " but has every intention of continuing to work at it  Overview:  Has lost 20 pounds through diet and exercise. Continue wonderful efforts  Continue with nutritionist       3. Primary insomnia  Doing well   Overview:  ALIX on CPAP     Orders:  -     CBC Auto Differential; Future; Expected date: 01/17/2025    4. Mixed hyperlipidemia  Overview:  Cholesterol improved with wieght loss     Orders:  -     CBC Auto Differential; Future; Expected date: 01/17/2025  -     Lipid Panel; Future; Expected date: 01/17/2025    5. ALIX on CPAP  Overview:  07/18/2022, 07/19/2022 Home Sleep Study    2 night study  MILD/BORDERLINE OBSTRUCTIVE SLEEP APNEA with overall AHI 9.5/hr ( 53 events): night # 2  Oxygen desaturation: 84%. SpO2 between 85% to 89% for 3 min.  Patient snored 98% time above 50 .  Heart rate range: 44 bpm - 74 bpm  08/03/2022 obtained Resmed CPAP machine  On Auto CPAP 4-2 cm with optimal control AHI 0.5  Nasal mask  HME: [facility]   11/21/2022 Overnight oximetry on room air on APAP 4-20 cm results pending    Orders:  -     CBC Auto Differential; Future; Expected date: 01/17/2025    6. Primary hypertension  Overview:  BP very wlel controlled but has improved with her 20 pound weigt loss through diet and exercise  Continue wonderful work  Limit salt in diet     Orders:  -     Comprehensive Metabolic Panel; Future; Expected date: 01/17/2025  -     TSH; Future; Expected date: 01/17/2025    7. Screening for colorectal cancer  -     Case Request Endoscopy: COLONOSCOPY, SCREENING, LOW RISK PATIENT    8. Elevated glucose  -     Hemoglobin A1C; Future; Expected date: 01/17/2025           WORRY SCORE 1    RTC in 6 months, sooner if needed and depending on labs.    Goldie Abraham MD  Board Certified Internist/Geriatrician  [facility] ([place])

## 2025-01-27 ENCOUNTER — TELEPHONE (OUTPATIENT)
Dept: PRIMARY CARE CLINIC | Facility: CLINIC | Age: 70
End: 2025-01-27
Payer: MEDICARE

## 2025-01-27 ENCOUNTER — TELEPHONE (OUTPATIENT)
Dept: GASTROENTEROLOGY | Facility: CLINIC | Age: 70
End: 2025-01-27
Payer: MEDICARE

## 2025-01-27 NOTE — TELEPHONE ENCOUNTER
Left message for pt requesting a call back at earliest convenience. Need to verify witness signatures on patient's ACP to make sure they aren't family members.

## 2025-01-27 NOTE — TELEPHONE ENCOUNTER
Vmail left asking pt to return call to schedule to scope. Call back # provided. [employer] message sent

## 2025-01-27 NOTE — TELEPHONE ENCOUNTER
----- Message from Sharonda sent at 1/27/2025  3:29 PM CST -----   Type:  Needs Medical Advice    Who Called:  PT      Would the patient rather a call back or a response via [facility]? Call  Best Call Back Number: [phone]        Additional Information:  need to schedule colonoscopy..  Please call back to advise. Thank you!

## 2025-01-28 ENCOUNTER — TELEPHONE (OUTPATIENT)
Dept: PRIMARY CARE CLINIC | Facility: CLINIC | Age: 70
End: 2025-01-28
Payer: MEDICARE

## 2025-01-29 ENCOUNTER — TELEPHONE (OUTPATIENT)
Dept: PRIMARY CARE CLINIC | Facility: CLINIC | Age: 70
End: 2025-01-29
Payer: MEDICARE

## 2025-01-29 NOTE — TELEPHONE ENCOUNTER
Left message for pt requesting a call back at earliest convenience. Need to verify that the witness signatures on her ACP are non-family members.

## 2025-02-21 ENCOUNTER — LAB VISIT (OUTPATIENT)
Dept: LAB | Facility: HOSPITAL | Age: 70
End: 2025-02-21
Attending: INTERNAL MEDICINE
Payer: MEDICARE

## 2025-02-21 DIAGNOSIS — R73.09 ELEVATED GLUCOSE: ICD-10-CM

## 2025-02-21 DIAGNOSIS — I10 PRIMARY HYPERTENSION: ICD-10-CM

## 2025-02-21 DIAGNOSIS — G47.33 OSA ON CPAP: ICD-10-CM

## 2025-02-21 DIAGNOSIS — F51.01 PRIMARY INSOMNIA: ICD-10-CM

## 2025-02-21 DIAGNOSIS — E78.2 MIXED HYPERLIPIDEMIA: ICD-10-CM

## 2025-02-21 LAB
ALBUMIN SERPL BCP-MCNC: 3.8 G/DL (ref 3.5–5.2)
ALP SERPL-CCNC: 47 U/L (ref 40–150)
ALT SERPL W/O P-5'-P-CCNC: 27 U/L (ref 10–44)
ANION GAP SERPL CALC-SCNC: 11 MMOL/L (ref 8–16)
AST SERPL-CCNC: 36 U/L (ref 10–40)
BASOPHILS # BLD AUTO: 0.03 K/UL (ref 0–0.2)
BASOPHILS NFR BLD: 0.5 % (ref 0–1.9)
BILIRUB SERPL-MCNC: 0.6 MG/DL (ref 0.1–1)
BUN SERPL-MCNC: 22 MG/DL (ref 8–23)
CALCIUM SERPL-MCNC: 9.6 MG/DL (ref 8.7–10.5)
CHLORIDE SERPL-SCNC: 108 MMOL/L (ref 95–110)
CHOLEST SERPL-MCNC: 234 MG/DL (ref 120–199)
CHOLEST/HDLC SERPL: 3.1 {RATIO} (ref 2–5)
CO2 SERPL-SCNC: 23 MMOL/L (ref 23–29)
CREAT SERPL-MCNC: 0.9 MG/DL (ref 0.5–1.4)
DIFFERENTIAL METHOD BLD: ABNORMAL
EOSINOPHIL # BLD AUTO: 0.2 K/UL (ref 0–0.5)
EOSINOPHIL NFR BLD: 2.4 % (ref 0–8)
ERYTHROCYTE [DISTWIDTH] IN BLOOD BY AUTOMATED COUNT: 14.1 % (ref 11.5–14.5)
EST. GFR  (NO RACE VARIABLE): >60 ML/MIN/1.73 M^2
ESTIMATED AVG GLUCOSE: 105 MG/DL (ref 68–131)
GLUCOSE SERPL-MCNC: 77 MG/DL (ref 70–110)
HBA1C MFR BLD: 5.3 % (ref 4–5.6)
HCT VFR BLD AUTO: 45.1 % (ref 37–48.5)
HDLC SERPL-MCNC: 75 MG/DL (ref 40–75)
HDLC SERPL: 32.1 % (ref 20–50)
HGB BLD-MCNC: 14.3 G/DL (ref 12–16)
IMM GRANULOCYTES # BLD AUTO: 0.02 K/UL (ref 0–0.04)
IMM GRANULOCYTES NFR BLD AUTO: 0.3 % (ref 0–0.5)
LDLC SERPL CALC-MCNC: 146.4 MG/DL (ref 63–159)
LYMPHOCYTES # BLD AUTO: 1.2 K/UL (ref 1–4.8)
LYMPHOCYTES NFR BLD: 19.3 % (ref 18–48)
MCH RBC QN AUTO: 29 PG (ref 27–31)
MCHC RBC AUTO-ENTMCNC: 31.7 G/DL (ref 32–36)
MCV RBC AUTO: 92 FL (ref 82–98)
MONOCYTES # BLD AUTO: 0.4 K/UL (ref 0.3–1)
MONOCYTES NFR BLD: 6.3 % (ref 4–15)
NEUTROPHILS # BLD AUTO: 4.4 K/UL (ref 1.8–7.7)
NEUTROPHILS NFR BLD: 71.2 % (ref 38–73)
NONHDLC SERPL-MCNC: 159 MG/DL
NRBC BLD-RTO: 0 /100 WBC
PLATELET # BLD AUTO: 205 K/UL (ref 150–450)
PMV BLD AUTO: 10.9 FL (ref 9.2–12.9)
POTASSIUM SERPL-SCNC: 4.6 MMOL/L (ref 3.5–5.1)
PROT SERPL-MCNC: 7.1 G/DL (ref 6–8.4)
RBC # BLD AUTO: 4.93 M/UL (ref 4–5.4)
SODIUM SERPL-SCNC: 142 MMOL/L (ref 136–145)
TRIGL SERPL-MCNC: 63 MG/DL (ref 30–150)
TSH SERPL DL<=0.005 MIU/L-ACNC: 2.81 UIU/ML (ref 0.4–4)
WBC # BLD AUTO: 6.17 K/UL (ref 3.9–12.7)

## 2025-02-21 PROCEDURE — 84443 ASSAY THYROID STIM HORMONE: CPT | Performed by: INTERNAL MEDICINE

## 2025-02-21 PROCEDURE — 83036 HEMOGLOBIN GLYCOSYLATED A1C: CPT | Performed by: INTERNAL MEDICINE

## 2025-02-21 PROCEDURE — 85025 COMPLETE CBC W/AUTO DIFF WBC: CPT | Performed by: INTERNAL MEDICINE

## 2025-02-21 PROCEDURE — 80061 LIPID PANEL: CPT | Performed by: INTERNAL MEDICINE

## 2025-02-21 PROCEDURE — 80053 COMPREHEN METABOLIC PANEL: CPT | Performed by: INTERNAL MEDICINE

## 2025-02-24 ENCOUNTER — RESULTS FOLLOW-UP (OUTPATIENT)
Dept: PRIMARY CARE CLINIC | Facility: CLINIC | Age: 70
End: 2025-02-24

## 2025-02-24 NOTE — PROGRESS NOTES
Labs show:   Normal CBC with no anemia nor other abnormal finding.   Cholesterol has worsened significantly. Would highly recommend making dietary changes to lower this. For example the LDL has gone from 113 to 146.   Hgb A1c shows well controlled carb intake.   TSH shows thyroid is at goal.   CMP shows normal electrolytes. Normal kidney function. Has actually improved. Normal liver function, calcium, protein etc

## 2025-03-05 DIAGNOSIS — R23.8 SKIN IRRITATION: ICD-10-CM

## 2025-03-06 RX ORDER — TRIAMCINOLONE ACETONIDE 0.25 MG/G
CREAM TOPICAL 2 TIMES DAILY
Qty: 15 G | Refills: 0 | Status: SHIPPED | OUTPATIENT
Start: 2025-03-06

## 2025-03-17 DIAGNOSIS — I10 PRIMARY HYPERTENSION: ICD-10-CM

## 2025-03-17 RX ORDER — ENALAPRIL MALEATE 2.5 MG/1
2.5 TABLET ORAL
Qty: 90 TABLET | Refills: 3 | Status: SHIPPED | OUTPATIENT
Start: 2025-03-17

## 2025-05-01 ENCOUNTER — ANESTHESIA EVENT (OUTPATIENT)
Dept: ENDOSCOPY | Facility: HOSPITAL | Age: 70
End: 2025-05-01
Payer: MEDICARE

## 2025-05-02 ENCOUNTER — HOSPITAL ENCOUNTER (OUTPATIENT)
Facility: HOSPITAL | Age: 70
Discharge: HOME | End: 2025-05-02
Attending: SURGERY | Admitting: SURGERY
Payer: MEDICARE

## 2025-05-02 ENCOUNTER — ANESTHESIA (OUTPATIENT)
Dept: ENDOSCOPY | Facility: HOSPITAL | Age: 70
End: 2025-05-02
Payer: MEDICARE

## 2025-05-02 VITALS
OXYGEN SATURATION: 99 % | RESPIRATION RATE: 15 BRPM | HEIGHT: 59 IN | DIASTOLIC BLOOD PRESSURE: 56 MMHG | SYSTOLIC BLOOD PRESSURE: 115 MMHG | TEMPERATURE: 97 F | HEART RATE: 63 BPM | WEIGHT: 190 LBS | BODY MASS INDEX: 38.3 KG/M2

## 2025-05-02 DIAGNOSIS — Z86.0100 HISTORY OF COLON POLYPS: Primary | ICD-10-CM

## 2025-05-02 PROCEDURE — G0105 COLORECTAL SCRN; HI RISK IND: HCPCS | Mod: ,,, | Performed by: SURGERY

## 2025-05-02 PROCEDURE — G0105 COLORECTAL SCRN; HI RISK IND: HCPCS | Mod: PO | Performed by: SURGERY

## 2025-05-02 PROCEDURE — 37000009 HC ANESTHESIA EA ADD 15 MINS: Mod: PO | Performed by: SURGERY

## 2025-05-02 PROCEDURE — 63600175 PHARM REV CODE 636 W HCPCS: Mod: PO | Performed by: NURSE ANESTHETIST, CERTIFIED REGISTERED

## 2025-05-02 PROCEDURE — 63600175 PHARM REV CODE 636 W HCPCS: Mod: PO | Performed by: SURGERY

## 2025-05-02 PROCEDURE — 37000008 HC ANESTHESIA 1ST 15 MINUTES: Mod: PO | Performed by: SURGERY

## 2025-05-02 RX ORDER — PROPOFOL 10 MG/ML
VIAL (ML) INTRAVENOUS CONTINUOUS PRN
Status: DISCONTINUED | OUTPATIENT
Start: 2025-05-02 | End: 2025-05-02

## 2025-05-02 RX ORDER — LIDOCAINE HYDROCHLORIDE 20 MG/ML
INJECTION INTRAVENOUS
Status: DISCONTINUED | OUTPATIENT
Start: 2025-05-02 | End: 2025-05-02

## 2025-05-02 RX ORDER — SODIUM CHLORIDE, SODIUM LACTATE, POTASSIUM CHLORIDE, CALCIUM CHLORIDE 600; 310; 30; 20 MG/100ML; MG/100ML; MG/100ML; MG/100ML
INJECTION, SOLUTION INTRAVENOUS CONTINUOUS
Status: DISCONTINUED | OUTPATIENT
Start: 2025-05-02 | End: 2025-05-02 | Stop reason: HOSPADM

## 2025-05-02 RX ORDER — PROPOFOL 10 MG/ML
VIAL (ML) INTRAVENOUS
Status: DISCONTINUED | OUTPATIENT
Start: 2025-05-02 | End: 2025-05-02

## 2025-05-02 RX ADMIN — PROPOFOL 140 MG: 10 INJECTION, EMULSION INTRAVENOUS at 09:05

## 2025-05-02 RX ADMIN — PROPOFOL 150 MCG/KG/MIN: 10 INJECTION, EMULSION INTRAVENOUS at 09:05

## 2025-05-02 RX ADMIN — SODIUM CHLORIDE, SODIUM LACTATE, POTASSIUM CHLORIDE, AND CALCIUM CHLORIDE: .6; .31; .03; .02 INJECTION, SOLUTION INTRAVENOUS at 09:05

## 2025-05-02 RX ADMIN — PROPOFOL 50 MG: 10 INJECTION, EMULSION INTRAVENOUS at 09:05

## 2025-05-02 RX ADMIN — LIDOCAINE HYDROCHLORIDE 50 MG: 20 INJECTION INTRAVENOUS at 09:05

## 2025-05-02 NOTE — PROVATION PATIENT INSTRUCTIONS
Discharge Summary/Instructions after an Endoscopic Procedure  Patient Name: Leticia Trevino  Patient MRN: [id number]  Patient YOB: 1955  Friday, May 2, 2025  Bairon Hennessy MD  Dear patient,  As a result of recent federal legislation (The Federal Cures Act), you may   receive lab or pathology results from your procedure in your [facility]   account before your physician is able to contact you. Your physician or   their representative will relay the results to you with their   recommendations at their soonest availability.  Thank you,  RESTRICTIONS:  During your procedure today, you received medications for sedation.  These   medications may affect your judgment, balance and coordination.  Therefore,   for 24 hours, you have the following restrictions:   - DO NOT drive a car, operate machinery, make legal/financial decisions,   sign important papers or drink alcohol.    ACTIVITY:  Today: no heavy lifting, straining or running due to procedural   sedation/anesthesia.  The following day: return to full activity including work.  DIET:  Eat and drink normally unless instructed otherwise.     TREATMENT FOR COMMON SIDE EFFECTS:  - Mild abdominal pain, nausea, belching, bloating or excessive gas:  rest,   eat lightly and use a heating pad.  - Sore Throat: treat with throat lozenges and/or gargle with warm salt   water.  - Because air was used during the procedure, expelling large amounts of air   from your rectum or belching is normal.  - If a bowel prep was taken, you may not have a bowel movement for 1-3 days.    This is normal.  SYMPTOMS TO WATCH FOR AND REPORT TO YOUR PHYSICIAN:  1. Abdominal pain or bloating, other than gas cramps.  2. Chest pain.  3. Back pain.  4. Signs of infection such as: chills or fever occurring within 24 hours   after the procedure.  5. Rectal bleeding, which would show as bright red, maroon, or black stools.   (A tablespoon of blood from the rectum is not serious, especially if    hemorrhoids are present.)  6. Vomiting.  7. Weakness or dizziness.  GO DIRECTLY TO THE NEAREST EMERGENCY ROOM IF YOU HAVE ANY OF THE FOLLOWING:      Difficulty breathing              Chills and/or fever over 101 F   Persistent vomiting and/or vomiting blood   Severe abdominal pain   Severe chest pain   Black, tarry stools   Bleeding- more than one tablespoon   Any other symptom or condition that you feel may need urgent attention  Your doctor recommends these additional instructions:  If any biopsies were taken, your doctors clinic will contact you in 1 to 2   weeks with any results.  You are being discharged to home.   Resume your regular diet.   Continue your present medications.   Your physician has recommended a repeat colonoscopy in five years for   surveillance.   Return to my office as needed.  For questions, problems or results please call your physician - Bairon Hennessy MD at Work:  [phone].  EMERGENCY PHONE NUMBER: [phone], LAB RESULTS: [phone]  IF A COMPLICATION OR EMERGENCY SITUATION ARISES AND YOU ARE UNABLE TO REACH   YOUR PHYSICIAN - GO DIRECTLY TO THE EMERGENCY ROOM.  ___________________________________________  Nurse Signature  ___________________________________________  Patient/Designated Responsible Party Signature  Bairon Hennessy MD  5/2/2025 10:32:34 AM  This report has been verified and signed electronically.  Dear patient,  As a result of recent federal legislation (The Federal Cures Act), you may   receive lab or pathology results from your procedure in your [facility]   account before your physician is able to contact you. Your physician or   their representative will relay the results to you with their   recommendations at their soonest availability.  Thank you.  PROVATION

## 2025-05-02 NOTE — H&P
[facility] - Endoscopy  History & Physical     Subjective:     Chief Complaint/Reason for Admission: history of colon polyps      History of Present Illness:   Patient 70 y.o. female presents for surveillance cscope.  Pt has history of colon polyps.  She denies abd pain or changes in bowel habits.  No significant PMhx or PShx.       Patient Active Problem List    Diagnosis Date Noted    Chronic kidney disease, stage 3a 01/17/2025    Rash 11/03/2023    Contact dermatitis due to poison ivy 11/03/2023    Weight loss 06/02/2023    Scoliosis 12/02/2022    Mixed hyperlipidemia 12/02/2022    ALIX on CPAP 07/25/2022    Severe obesity (BMI 35.0-39.9) with comorbidity 07/06/2021    PLMD (periodic limb movement disorder)     Insomnia     Circadian rhythm disorder     Snores     HTN (hypertension) 04/16/2021    CKD (chronic kidney disease) stage 3, GFR 30-59 ml/min 04/16/2021    History of colonic polyps 03/04/2014        Prescriptions Prior to Admission[1]  Review of patient's allergies indicates:   Allergen Reactions    Sulfa (sulfonamide antibiotics) Hives and Itching    Poison oak extract Hives    Metal staples [surgical stainless steel] Rash     Allergic to metal        Past Medical History:   Diagnosis Date    Autoimmune inflammation of skeletal muscle     Class 2 obesity with alveolar hypoventilation, serious comorbidity, and body mass index (BMI) of 38.0 to 38.9 in adult 04/16/2021    Hypertension     Nocturnal hypoxemia due to obesity     07/18/2022, 07/19/2022 Home Sleep Study  2 night study MILD/BORDERLINE OBSTRUCTIVE SLEEP APNEA with overall AHI 9.5/hr ( 53 events): night # 2 Oxygen desaturation: 84%. SpO2 between 85% to 89% for 3 min. Patient snored 98% time above 50 .Heart rate range: 44 bpm - 74 bpm 6/28/2021 PSG No Significant Obstructive Sleep apnea(ALIX): AHI was 3.9/hr ( 17 events). The cumulative time under 88% oxygen     Positive D dimer 12/02/2022    Sleep apnea     does not uses CPAP      Past Surgical  History:   Procedure Laterality Date    APPENDECTOMY      FOOT SURGERY Left     TONSILLECTOMY      TUBAL LIGATION          Social History     Tobacco Use    Smoking status: Former     Current packs/day: 0.00     Average packs/day: 0.5 packs/day for 29.6 years (14.8 ttl pk-yrs)     Types: Cigarettes     Start date: 1970     Quit date: 11/3/1999     Years since quittin.5    Smokeless tobacco: Never   Substance Use Topics    Alcohol use: Yes     Comment: occasionally        Review of Systems:  A comprehensive review of systems was negative.    OBJECTIVE:     No data found.  AAOx3  Soft/nd/nt  No resp distress    Data Review:      ASSESSMENT/PLAN:     There are no hospital problems to display for this patient.    Hx of colon polyps  Plan:  TO have cscope today         [1]   Medications Prior to Admission   Medication Sig Dispense Refill Last Dose/Taking    enalapril (VASOTEC) 2.5 MG tablet Take 1 tablet by mouth once daily 90 tablet 3     FERROUS FUMARATE/IRON PS CPLX (FERROUS FUMARATE-IRON PS CMPLX ORAL) Take 1 tablet by mouth twice a week.        triamcinolone acetonide 0.025% (KENALOG) 0.025 % cream APPLY  CREAM TOPICALLY TO AFFECTED AREA TWICE DAILY 15 g 0

## 2025-05-02 NOTE — TRANSFER OF CARE
"Anesthesia Transfer of Care Note    Patient: Leticia Trevino    Procedure(s) Performed: Procedure(s) (LRB):  COLONOSCOPY, SCREENING, HIGH RISK PATIENT (N/A)    Patient location: PACU    Anesthesia Type: general    Transport from OR: Transported from OR on room air with adequate spontaneous ventilation    Post pain: adequate analgesia    Post assessment: no apparent anesthetic complications and tolerated procedure well    Post vital signs: stable    Level of consciousness: sedated    Nausea/Vomiting: no nausea/vomiting    Complications: none    Transfer of care protocol was followed      Last vitals: Visit Vitals  BP (!) 140/81 (BP Location: Right arm, Patient Position: Lying)   Pulse 64   Temp 36 °C (96.8 °F) (Skin)   Resp 16   Ht 4' 11" (1.499 m)   Wt 86.2 kg (190 lb)   SpO2 96%   Breastfeeding No   BMI 38.38 kg/m²     "

## 2025-05-02 NOTE — ANESTHESIA PREPROCEDURE EVALUATION
05/02/2025  Leticia Trevino is a 70 y.o., female.      Pre-op Assessment    I have reviewed the Patient Summary Reports.     I have reviewed the Nursing Notes. I have reviewed the NPO Status.   I have reviewed the Medications.     Review of Systems  Cardiovascular:     Hypertension                                          Pulmonary:        Sleep Apnea, CPAP                Renal/:  Chronic Renal Disease, CKD                Hepatic/GI:  Bowel Prep.                   Musculoskeletal:         Spine Disorders:             Neurological:    Neuromuscular Disease,                                   Endocrine:        Morbid Obesity / BMI > 40      Physical Exam  General: Well nourished    Airway:  Mallampati: III   Neck ROM: Normal ROM    Dental:  Intact        Anesthesia Plan  Type of Anesthesia, risks & benefits discussed:    Anesthesia Type: Gen Natural Airway  Intra-op Monitoring Plan: Standard ASA Monitors  Induction:  IV  Informed Consent: Informed consent signed with the Patient and all parties understand the risks and agree with anesthesia plan.  All questions answered.   ASA Score: 3    Ready For Surgery From Anesthesia Perspective.     .

## 2025-05-05 NOTE — ANESTHESIA POSTPROCEDURE EVALUATION
Anesthesia Post Evaluation    Patient: Leticia Trevino    Procedure(s) Performed: Procedure(s) (LRB):  COLONOSCOPY, SCREENING, HIGH RISK PATIENT (N/A)    Final Anesthesia Type: general      Patient location during evaluation: PACU  Patient participation: Yes- Able to Participate  Level of consciousness: sedated and awake  Post-procedure vital signs: reviewed and stable  Pain management: adequate  Airway patency: patent    PONV status at discharge: No PONV  Anesthetic complications: no      Cardiovascular status: blood pressure returned to baseline and hemodynamically stable  Respiratory status: spontaneous ventilation  Hydration status: euvolemic  Follow-up not needed.              Vitals Value Taken Time   /56 05/02/25 10:30   Temp 36.3 °C (97.3 °F) 05/02/25 10:10   Pulse 63 05/02/25 10:30   Resp 15 05/02/25 10:30   SpO2 99 % 05/02/25 10:30         Event Time   Out of Recovery 10:40:00         Pain/Dominique Score: No data recorded

## 2025-06-02 DIAGNOSIS — R23.8 SKIN IRRITATION: ICD-10-CM

## 2025-06-02 RX ORDER — TRIAMCINOLONE ACETONIDE 0.25 MG/G
CREAM TOPICAL 2 TIMES DAILY
Qty: 15 G | Refills: 1 | Status: SHIPPED | OUTPATIENT
Start: 2025-06-02

## 2025-06-06 ENCOUNTER — HOSPITAL ENCOUNTER (OUTPATIENT)
Dept: RADIOLOGY | Facility: HOSPITAL | Age: 70
Discharge: HOME | End: 2025-06-06
Attending: INTERNAL MEDICINE
Payer: MEDICARE

## 2025-06-06 DIAGNOSIS — Z12.31 ENCOUNTER FOR SCREENING MAMMOGRAM FOR BREAST CANCER: ICD-10-CM

## 2025-06-06 PROCEDURE — 77063 BREAST TOMOSYNTHESIS BI: CPT | Mod: 26,,, | Performed by: RADIOLOGY

## 2025-06-06 PROCEDURE — 77067 SCR MAMMO BI INCL CAD: CPT | Mod: 26,,, | Performed by: RADIOLOGY

## 2025-06-06 PROCEDURE — 77067 SCR MAMMO BI INCL CAD: CPT | Mod: TC,PO

## 2025-06-09 ENCOUNTER — PATIENT MESSAGE (OUTPATIENT)
Dept: PRIMARY CARE CLINIC | Facility: CLINIC | Age: 70
End: 2025-06-09
Payer: MEDICARE

## 2025-06-10 ENCOUNTER — RESULTS FOLLOW-UP (OUTPATIENT)
Dept: PRIMARY CARE CLINIC | Facility: CLINIC | Age: 70
End: 2025-06-10

## 2025-06-13 ENCOUNTER — HOSPITAL ENCOUNTER (OUTPATIENT)
Dept: RADIOLOGY | Facility: HOSPITAL | Age: 70
Discharge: HOME | End: 2025-06-13
Attending: INTERNAL MEDICINE
Payer: MEDICARE

## 2025-06-13 DIAGNOSIS — R92.8 ABNORMALITY OF LEFT BREAST ON SCREENING MAMMOGRAM: ICD-10-CM

## 2025-06-13 PROCEDURE — 77065 DX MAMMO INCL CAD UNI: CPT | Mod: 26,LT,, | Performed by: RADIOLOGY

## 2025-06-13 PROCEDURE — 76642 ULTRASOUND BREAST LIMITED: CPT | Mod: 26,LT,, | Performed by: RADIOLOGY

## 2025-06-13 PROCEDURE — 77061 BREAST TOMOSYNTHESIS UNI: CPT | Mod: 26,LT,, | Performed by: RADIOLOGY

## 2025-06-13 PROCEDURE — 77065 DX MAMMO INCL CAD UNI: CPT | Mod: TC,PO,LT

## 2025-06-13 PROCEDURE — 76642 ULTRASOUND BREAST LIMITED: CPT | Mod: TC,PO,LT

## 2025-06-16 ENCOUNTER — PATIENT MESSAGE (OUTPATIENT)
Dept: PRIMARY CARE CLINIC | Facility: CLINIC | Age: 70
End: 2025-06-16
Payer: MEDICARE

## 2025-06-16 ENCOUNTER — TELEPHONE (OUTPATIENT)
Dept: RADIOLOGY | Facility: HOSPITAL | Age: 70
End: 2025-06-16
Payer: MEDICARE

## 2025-06-16 DIAGNOSIS — R92.8 ABNORMAL MAMMOGRAM: Primary | ICD-10-CM

## 2025-06-16 DIAGNOSIS — N63.20 MASS OF LEFT BREAST, UNSPECIFIED QUADRANT: Primary | ICD-10-CM

## 2025-06-16 NOTE — TELEPHONE ENCOUNTER
Ultrasound guided biopsy scheduled for 6/17/25 at 8:30, arrival time 8am.  Biopsy instructions given with understandings verbalized. Patient has my contact information.

## 2025-06-17 ENCOUNTER — HOSPITAL ENCOUNTER (OUTPATIENT)
Dept: RADIOLOGY | Facility: HOSPITAL | Age: 70
Discharge: HOME | End: 2025-06-17
Attending: INTERNAL MEDICINE
Payer: MEDICARE

## 2025-06-17 DIAGNOSIS — R92.8 ABNORMAL MAMMOGRAM: ICD-10-CM

## 2025-06-17 PROCEDURE — 77065 DX MAMMO INCL CAD UNI: CPT | Mod: 26,LT,, | Performed by: RADIOLOGY

## 2025-06-17 PROCEDURE — 77065 DX MAMMO INCL CAD UNI: CPT | Mod: TC,LT

## 2025-06-17 PROCEDURE — A4648 IMPLANTABLE TISSUE MARKER: HCPCS

## 2025-06-17 PROCEDURE — 88360 TUMOR IMMUNOHISTOCHEM/MANUAL: CPT | Mod: TC | Performed by: RADIOLOGY

## 2025-06-17 PROCEDURE — 19083 BX BREAST 1ST LESION US IMAG: CPT | Mod: LT,,, | Performed by: RADIOLOGY

## 2025-06-19 ENCOUNTER — RESULTS FOLLOW-UP (OUTPATIENT)
Dept: PRIMARY CARE CLINIC | Facility: CLINIC | Age: 70
End: 2025-06-19
Payer: MEDICARE

## 2025-06-19 DIAGNOSIS — C50.912 INFILTRATING DUCTAL CARCINOMA OF LEFT BREAST: Primary | ICD-10-CM

## 2025-06-19 LAB
DHEA SERPL-MCNC: ABNORMAL
ESTROGEN SERPL-MCNC: ABNORMAL PG/ML
INSULIN SERPL-ACNC: ABNORMAL U[IU]/ML
LAB AP CLINICAL INFORMATION: ABNORMAL
LAB AP DIAGNOSIS CATEGORY: ABNORMAL
LAB AP GROSS DESCRIPTION: ABNORMAL
LAB AP PERFORMING LOCATION(S): ABNORMAL
LAB AP REPORT FOOTNOTES: ABNORMAL

## 2025-06-20 ENCOUNTER — TELEPHONE (OUTPATIENT)
Dept: SURGICAL ONCOLOGY | Facility: CLINIC | Age: 70
End: 2025-06-20
Payer: MEDICARE

## 2025-06-20 ENCOUNTER — PATIENT MESSAGE (OUTPATIENT)
Dept: PRIMARY CARE CLINIC | Facility: CLINIC | Age: 70
End: 2025-06-20
Payer: MEDICARE

## 2025-06-20 NOTE — TELEPHONE ENCOUNTER
called patient to discuss recent breast biopsy results. patient did not answer. [facility] with call back number of [phone]

## 2025-06-20 NOTE — TELEPHONE ENCOUNTER
called patient to discuss recent breast biopsy results. patient did not answer. [facility] with call back number of [phone]

## 2025-06-23 ENCOUNTER — TELEPHONE (OUTPATIENT)
Dept: SURGICAL ONCOLOGY | Facility: CLINIC | Age: 70
End: 2025-06-23
Payer: MEDICARE

## 2025-06-23 NOTE — TELEPHONE ENCOUNTER
called patien to discuss recent breast biopsy results. patient did not answer. [facility] with call back number of [phone]

## 2025-06-25 ENCOUNTER — PATIENT MESSAGE (OUTPATIENT)
Dept: SURGERY | Facility: CLINIC | Age: 70
End: 2025-06-25
Payer: MEDICARE

## 2025-06-26 ENCOUNTER — TELEPHONE (OUTPATIENT)
Dept: PRIMARY CARE CLINIC | Facility: CLINIC | Age: 70
End: 2025-06-26
Payer: MEDICARE

## 2025-06-26 NOTE — TELEPHONE ENCOUNTER
----- Message from Med Assistant Ward sent at 6/25/2025  3:48 PM CDT -----  Regarding: NEW CANCER PATIENT  Sobeida, I work with Dr. Lauren Mcclendon in [place]. We had received the referral on Mrs. Kelly but after I read some into her chart, I just wanted to confirm she is going to be followed by a doctor closer to [place]?

## 2025-06-27 ENCOUNTER — TELEPHONE (OUTPATIENT)
Dept: SURGERY | Facility: CLINIC | Age: 70
End: 2025-06-27
Payer: MEDICARE

## 2025-07-02 ENCOUNTER — OFFICE VISIT (OUTPATIENT)
Dept: SURGERY | Facility: CLINIC | Age: 70
End: 2025-07-02
Payer: MEDICARE

## 2025-07-02 VITALS — WEIGHT: 190 LBS | BODY MASS INDEX: 38.3 KG/M2 | HEIGHT: 59 IN

## 2025-07-02 DIAGNOSIS — Z17.0 ESTROGEN RECEPTOR POSITIVE: ICD-10-CM

## 2025-07-02 DIAGNOSIS — C50.812 MALIGNANT NEOPLASM OF OVERLAPPING SITES OF LEFT BREAST IN FEMALE, ESTROGEN RECEPTOR POSITIVE: Primary | ICD-10-CM

## 2025-07-02 DIAGNOSIS — Z17.0 MALIGNANT NEOPLASM OF OVERLAPPING SITES OF LEFT BREAST IN FEMALE, ESTROGEN RECEPTOR POSITIVE: Primary | ICD-10-CM

## 2025-07-02 DIAGNOSIS — Z31.5 ENCOUNTER FOR PROCREATIVE GENETIC COUNSELING AND TESTING: ICD-10-CM

## 2025-07-02 DIAGNOSIS — Z80.3 FAMILY HISTORY OF MALIGNANT NEOPLASM OF BREAST: ICD-10-CM

## 2025-07-02 PROCEDURE — 99999 PR PBB SHADOW E&M-EST. PATIENT-LVL III: CPT | Mod: PBBFAC,,, | Performed by: SURGERY

## 2025-07-02 NOTE — PROGRESS NOTES
[facility]  MD Tashia Hooper, NP-C        Date of Service: 7/2/2025    Chief Complaint:   Leticia Trevino is a 70 y.o. female presenting today for  a new patient appointment to establish breast care. She does have a new diagnosis of left breast cancer. She is due for a discussion of her workup and treatment options.  She reports no interval changes on her self-breast examination.     History of Present Illness:   Mrs. Leticia Trevino presents on 7/2/2025 due to her recently diagnosed left breast cancer. She has no breast pain, nipple discharge or skin changes. She has had a left breast core biopsy. She has not been genetically tested. She does report a family history of breast cancer.  MD:::    PATHOLOGY:    06/17/2025 [facility]  FINAL PATHOLOGY:  BREAST, LEFT, 3:00, BIOPSY:  - Invasive ductal carcinoma with lobular features.  - Size of invasive carcinoma:  11 MM in greatest contiguous linear dimension.  - Genie Histologic Score: Grade 1 of 3.                    Tubule Formation:  3                    Nuclear Pleomorphism:  1                    Mitotic Activity:  1  - Ductal carcinoma in-situ (DCIS), intermediate nuclear grade, solid type, without central necrosis is present.  - Size of DCIS:  2 MM.  - No lymphovascular invasion.  - Microcalcifications:  Seen in association with invasive carcinoma.  - BREAST BIOMARKERS:  ER:  Positive (95% of tumor cells demonstrate strong nuclear immunoreactivity).  AK:  Negative.  [id number]:  Negative (0).  Ki-67 proliferative index:  10%.  Comments: Areas of DCIS demonstrate intact p63 immunoreactivity with positive E-cadherin staining, supporting the finding of DCIS.  E-cadherin immunohistochemical staining is also positive within areas of invasive carcinoma, supporting the finding of invasive ductal carcinoma with lobular features.  Dr. ANAHY Robbins has reviewed the case and concurs with the  diagnosis.  Immunohistochemical staining is interpreted in the setting of appropriate positive and negative controls.      Past Medical History:   Diagnosis Date    Autoimmune inflammation of skeletal muscle     Breast cancer, left breast 2025    3 o'clock left breast    Class 2 obesity with alveolar hypoventilation, serious comorbidity, and body mass index (BMI) of 38.0 to 38.9 in adult 2021    Hypertension     Malignant neoplasm of overlapping sites of left breast in female, estrogen receptor positive 2025    Nocturnal hypoxemia due to obesity     2022, 2022 Home Sleep Study  2 night study MILD/BORDERLINE OBSTRUCTIVE SLEEP APNEA with overall AHI 9.5/hr ( 53 events): night # 2 Oxygen desaturation: 84%. SpO2 between 85% to 89% for 3 min. Patient snored 98% time above 50 .Heart rate range: 44 bpm - 74 bpm 2021 PSG No Significant Obstructive Sleep apnea(ALIX): AHI was 3.9/hr ( 17 events). The cumulative time under 88% oxygen     Positive D dimer 2022    Sleep apnea     does not uses CPAP      Past Surgical History:   Procedure Laterality Date    APPENDECTOMY      BREAST BIOPSY Left 2025    COLONOSCOPY, SCREENING, HIGH RISK PATIENT N/A 2025    Procedure: COLONOSCOPY, SCREENING, HIGH RISK PATIENT;  Surgeon: Bairon Hennessy MD;  Location: [facility];  Service: Endoscopy;  Laterality: N/A;    FOOT SURGERY Left     TONSILLECTOMY      TUBAL LIGATION        Current Medications[1]   Review of patient's allergies indicates:   Allergen Reactions    Sulfa (sulfonamide antibiotics) Hives and Itching    Poison oak extract Hives    Metal staples [surgical stainless steel] Rash     Allergic to metal      Social History     Tobacco Use    Smoking status: Former     Current packs/day: 0.00     Average packs/day: 0.5 packs/day for 29.6 years (14.8 ttl pk-yrs)     Types: Cigarettes     Start date: 1970     Quit date: 11/3/1999     Years since quittin.6    Smokeless tobacco:  Never   Substance Use Topics    Alcohol use: Yes     Comment: occasionally      Family History   Problem Relation Name Age of Onset    Aneurysm Mother Lata Anderson     Alcohol abuse Mother Lata Anderson     Stroke Mother Lata Anderson         aneurism    Stroke Father Rober Soto Sr     Alcohol abuse Father Rober Soto Sr     Breast cancer Sister Paty sousa 55    Arthritis Sister Paty sousa     Memory loss Sister Paty sousa     Cancer Sister Paty sousa     Ovarian cancer Neg Hx          Review of Systems   All other systems reviewed and are negative.       Physical Exam   Pulmonary/Chest: Effort normal and breath sounds normal. No tachypnea and no bradypnea. She has no decreased breath sounds. She has no wheezes. She has no rhonchi. She has no rales. Right breast exhibits no inverted nipple, no mass, no nipple discharge, no skin change and no tenderness. Left breast exhibits mass (Dense nodularity, 3 o'clock position, 10 cm from the nipple with biopsy related changes), skin change (Biopsy related changes) and tenderness (Associated with biopsy). Left breast exhibits no inverted nipple and no nipple discharge. No breast swelling or bleeding. Breasts are symmetrical.   Genitourinary: No breast swelling or bleeding.        MAMMOGRAM REPORT:      06/13/2025  Facility:  [facility] [address] [place] [id number] [phone]  Name: Leticia Trevino  MRN: [id number]  Result: Mammo Digital Diagnostic Left with Jonathan (XPD)--US Breast Left Limited  Findings:  Mammo Digital Diagnostic Left with Jonathan (XPD): There are scattered areas of fibroglandular density. There is an irregularly shaped mass seen in the outer region of the left breast at 3 and 9 o'clock in the middle depth. The mass correlates with the prior mammogram finding.   US Breast Left Limited: There is a 14 mm x 11 mm irregularly shaped, heterogeneous mass seen in the outer region of the left breast at 3 o'clock in the  middle depth. The mass correlates with the mass seen on the mammogram.  Ultrasound evaluation of the ipsilateral axilla was performed and demonstrates no suspicious lymph nodes.    Impression:  Left Mass: Left breast mass in the outer region at 3 and 9 o'clock in the middle depth. Assessment: 4C - Suspicious finding. Ultrasound-guided biopsy is recommended.   BI-RADS Category: Overall: 4 - Suspicious  Recommendation: Ultrasound-guided biopsy is recommended.    06/06/2025   Facility:  [facility] [address] [place] [id number]  [phone]  Name: Leticia Trevino  MRN: [id number]  Result:   Mammo Digital Screening Bilat w/ Jonathan (XPD)  Findings: There are scattered areas of fibroglandular density.   Left: There is a focal asymmetry seen in the outer region of the left breast in the middle depth.   Right: There is no evidence of suspicious masses, calcifications, or other abnormal findings in the right breast.  Impression:  Left Focal Asymmetry: Left breast focal asymmetry in the outer region in the middle depth. Assessment: 0 - Incomplete. Special Views: Spot Compression View and Ultrasound are recommended.   Right: There is no mammographic evidence of malignancy in the right breast.  BI-RADS Category: Overall: 0 - Incomplete: Needs Additional Imaging Evaluation  Recommendation: Diagnostic mammogram including spot compression views is recommended.  Breast ultrasound is recommended.    ULTRASOUND REPORT: SEE ABOVE    NOTE:::We viewed her films together at today's visit.  We discussed the multiple views obtained and the important findings.  Even benign changes were mentioned and her questions were answered.  She knows that she may receive a formal letter or report from the Radiologist.  She is to contact us if she has questions.    ASSESSMENT and PLAN OF CARE     1. Malignant neoplasm of overlapping sites of left breast in female, estrogen receptor positive  Assessment &  Plan:  70-year-old female with left breast clinical prognostic Stage IA [id number] invasive ductal carcinoma with lobular features, estrogen receptor 95%, progesterone receptor 0%, HER2 Ana 0%, grade 1, KS 67 10%.  #1.  Left breast cancer, 3 o'clock position - Mastectomy vs. Radar Localized Lumpectomy was discussed in detail with the patient.  The patient understands if lumpectomy is performed, the radiologist will place a reflector to localize the cancer.  The reflector, previously placed clip, and cancer will be removed.  I will place additional clips to julián the location of the lumpectomy cavity.  The risks and benefits of mastectomy and radar localized lumpectomy were discussed in detail with the patient to include, but not limited to, bleeding, infection, pain, scar, nerve or vessel injury, need for another procedure, risk of anesthesia, myocardial infarction, cerebral vascular accident, thrombosis, death.  #2.  Axillary Assessment - Left [place] Lymphnode Biopsy with injection of radioactive nucleotide and blue dye - the patient understands that we will place radioactive nucleotide as well as blue dye in order to assist in idenification of the sentinel lymphnode.  If the patient is undergoing mastectomy or lumpectomy after neoadjuvanct chemotherapy, and a sentinel lymphnode is positive for cancer, I will perform an axillary lymphnode dissection.  If the sentinel lymphnode is negative for cancer, no further surgery will be performed.  However, if the patient is undergoing lumpectomy, and the sentinel lymphnode shows evidence of metastatic cancer, she will likely proceed forward with radiation.  The risks and benefits of sentinel lymphnode biopsy and axillary lymphnode dissection was discussed in detail with the patient to include, but not limited to, lymphedema.  #3. Medical Oncology - Patient will follow up with Dr. Tucker to discuss chemotherapy and/or endocrine therapy.   #4. Radiation Oncology - the patient  does understand that she may require post operative radiation in order to decrease the risk for local recurrence.  The patient will be seeing Dr. Flores in [place] to further discuss.   #5. Reconstructive Surgery - If mastectomy is required, the patient will likely be a candidate for immediate versus delayed reconstruction.  If lumpectomy is performed, the patient may be a candidate for bilateral oncoplastic procedure. She will further discuss this with Dr. See.  #6.  Bilateral Breast MRI - to assess for multifocal/multicentric cancer and for surgical planning.   #7. Genetic Testing -we did discuss genetic testing, she is postmenopausal, she had a sister with breast cancer, however she was post-menapausal.  She will likely decline, but wishes to think more about this.  #8.  Pathology consult-to review outside slides.  #9.  Healthy nutrition, routine exercise, minimal alcohol, no smoking-risk reduction.   #10.  Return to clinic after MRI and evaluation by plastics and Reconstructive surgery for definitive surgical plan.    I have spent an estimated 60 minutes with the patient, the majority of which was counseling. Patient will be treated according to NCCN guidelines.      Orders:  -     Ambulatory referral/consult to Breast Surgery    2. Estrogen receptor positive    3. Encounter for procreative genetic counseling and testing    4. Family history of malignant neoplasm of breast    Medical Decision Making: It is my impression that this patient suffers all conditions contained in this medical document.  Each of these conditions did affect our plan of care and my medical decision making today.  It is my opinion that the medical decision making concerning this patient was of moderate difficulty based on the aforementioned conditions.  Any further recommendations will be communicated to the patient by me.  I have reviewed and verified her allergies, list of medications, medical and surgical histories, social history,  and a pertinent review of symptoms.     Follow up:  After MRI    For:  Preop for surgery                [1]   Current Outpatient Medications:     enalapril (VASOTEC) 2.5 MG tablet, Take 1 tablet by mouth once daily, Disp: 90 tablet, Rfl: 3    FERROUS FUMARATE/IRON PS CPLX (FERROUS FUMARATE-IRON PS CMPLX ORAL), Take 1 tablet by mouth twice a week. , Disp: , Rfl:     triamcinolone acetonide 0.025% (KENALOG) 0.025 % cream, APPLY  CREAM TOPICALLY TO AFFECTED AREA TWICE DAILY, Disp: 15 g, Rfl: 1

## 2025-07-02 NOTE — ASSESSMENT & PLAN NOTE
70-year-old female with left breast clinical prognostic Stage IA [id number] invasive ductal carcinoma with lobular features, estrogen receptor 95%, progesterone receptor 0%, HER2 Ana 0%, grade 1, KS 67 10%.  #1.  Left breast cancer, 3 o'clock position - Mastectomy vs. Radar Localized Lumpectomy was discussed in detail with the patient.  The patient understands if lumpectomy is performed, the radiologist will place a reflector to localize the cancer.  The reflector, previously placed clip, and cancer will be removed.  I will place additional clips to julián the location of the lumpectomy cavity.  The risks and benefits of mastectomy and radar localized lumpectomy were discussed in detail with the patient to include, but not limited to, bleeding, infection, pain, scar, nerve or vessel injury, need for another procedure, risk of anesthesia, myocardial infarction, cerebral vascular accident, thrombosis, death.  #2.  Axillary Assessment - Left [place] Lymphnode Biopsy with injection of radioactive nucleotide and blue dye - the patient understands that we will place radioactive nucleotide as well as blue dye in order to assist in idenification of the sentinel lymphnode.  If the patient is undergoing mastectomy or lumpectomy after neoadjuvanct chemotherapy, and a sentinel lymphnode is positive for cancer, I will perform an axillary lymphnode dissection.  If the sentinel lymphnode is negative for cancer, no further surgery will be performed.  However, if the patient is undergoing lumpectomy, and the sentinel lymphnode shows evidence of metastatic cancer, she will likely proceed forward with radiation.  The risks and benefits of sentinel lymphnode biopsy and axillary lymphnode dissection was discussed in detail with the patient to include, but not limited to, lymphedema.  #3. Medical Oncology - Patient will follow up with Dr. Tucker to discuss chemotherapy and/or endocrine therapy.   #4. Radiation Oncology - the patient does  understand that she may require post operative radiation in order to decrease the risk for local recurrence.  The patient will be seeing Dr. Flores in [place] to further discuss.   #5. Reconstructive Surgery - If mastectomy is required, the patient will likely be a candidate for immediate versus delayed reconstruction.  If lumpectomy is performed, the patient may be a candidate for bilateral oncoplastic procedure. She will further discuss this with Dr. See.  #6.  Bilateral Breast MRI - to assess for multifocal/multicentric cancer and for surgical planning.   #7. Genetic Testing -we did discuss genetic testing, she is postmenopausal, she had a sister with breast cancer, however she was post-menapausal.  She will likely decline, but wishes to think more about this.  #8.  Pathology consult-to review outside slides.  #9.  Healthy nutrition, routine exercise, minimal alcohol, no smoking-risk reduction.   #10.  Return to clinic after MRI and evaluation by plastics and Reconstructive surgery for definitive surgical plan.    I have spent an estimated 60 minutes with the patient, the majority of which was counseling. Patient will be treated according to NCCN guidelines.

## 2025-07-07 ENCOUNTER — OFFICE VISIT (OUTPATIENT)
Dept: PRIMARY CARE CLINIC | Facility: CLINIC | Age: 70
End: 2025-07-07
Payer: MEDICARE

## 2025-07-07 ENCOUNTER — PATIENT MESSAGE (OUTPATIENT)
Dept: PRIMARY CARE CLINIC | Facility: CLINIC | Age: 70
End: 2025-07-07
Payer: MEDICARE

## 2025-07-07 VITALS
BODY MASS INDEX: 39.29 KG/M2 | SYSTOLIC BLOOD PRESSURE: 124 MMHG | HEIGHT: 59 IN | OXYGEN SATURATION: 96 % | TEMPERATURE: 98 F | WEIGHT: 194.88 LBS | DIASTOLIC BLOOD PRESSURE: 82 MMHG | HEART RATE: 67 BPM

## 2025-07-07 DIAGNOSIS — M79.674 TOE PAIN, RIGHT: Primary | ICD-10-CM

## 2025-07-07 DIAGNOSIS — R23.8 SKIN IRRITATION: ICD-10-CM

## 2025-07-07 PROCEDURE — 1157F ADVNC CARE PLAN IN RCRD: CPT | Mod: CPTII,S$GLB,, | Performed by: PHYSICIAN ASSISTANT

## 2025-07-07 PROCEDURE — 1159F MED LIST DOCD IN RCRD: CPT | Mod: CPTII,S$GLB,, | Performed by: PHYSICIAN ASSISTANT

## 2025-07-07 PROCEDURE — 1125F AMNT PAIN NOTED PAIN PRSNT: CPT | Mod: CPTII,S$GLB,, | Performed by: PHYSICIAN ASSISTANT

## 2025-07-07 PROCEDURE — 3288F FALL RISK ASSESSMENT DOCD: CPT | Mod: CPTII,S$GLB,, | Performed by: PHYSICIAN ASSISTANT

## 2025-07-07 PROCEDURE — 3079F DIAST BP 80-89 MM HG: CPT | Mod: CPTII,S$GLB,, | Performed by: PHYSICIAN ASSISTANT

## 2025-07-07 PROCEDURE — 1101F PT FALLS ASSESS-DOCD LE1/YR: CPT | Mod: CPTII,S$GLB,, | Performed by: PHYSICIAN ASSISTANT

## 2025-07-07 PROCEDURE — 3008F BODY MASS INDEX DOCD: CPT | Mod: CPTII,S$GLB,, | Performed by: PHYSICIAN ASSISTANT

## 2025-07-07 PROCEDURE — 99999 PR PBB SHADOW E&M-EST. PATIENT-LVL III: CPT | Mod: PBBFAC,,, | Performed by: PHYSICIAN ASSISTANT

## 2025-07-07 PROCEDURE — 3044F HG A1C LEVEL LT 7.0%: CPT | Mod: CPTII,S$GLB,, | Performed by: PHYSICIAN ASSISTANT

## 2025-07-07 PROCEDURE — 4010F ACE/ARB THERAPY RXD/TAKEN: CPT | Mod: CPTII,S$GLB,, | Performed by: PHYSICIAN ASSISTANT

## 2025-07-07 PROCEDURE — 99215 OFFICE O/P EST HI 40 MIN: CPT | Mod: S$GLB,,, | Performed by: PHYSICIAN ASSISTANT

## 2025-07-07 PROCEDURE — 3074F SYST BP LT 130 MM HG: CPT | Mod: CPTII,S$GLB,, | Performed by: PHYSICIAN ASSISTANT

## 2025-07-07 RX ORDER — CLOTRIMAZOLE AND BETAMETHASONE DIPROPIONATE 10; .64 MG/G; MG/G
CREAM TOPICAL 2 TIMES DAILY
Qty: 15 G | Refills: 0 | Status: SHIPPED | OUTPATIENT
Start: 2025-07-07

## 2025-07-07 RX ORDER — TRIAMCINOLONE ACETONIDE 0.25 MG/G
CREAM TOPICAL 2 TIMES DAILY
Qty: 15 G | Refills: 1 | Status: SHIPPED | OUTPATIENT
Start: 2025-07-07

## 2025-07-07 NOTE — PROGRESS NOTES
"Subjective:      Patient ID: Leticia Trevino is a 70 y.o. female.    Vitals:  height is 4' 11" (1.499 m) and weight is 88.4 kg (194 lb 14.2 oz). Her temperature is 97.7 °F (36.5 °C). Her blood pressure is 124/82 and her pulse is 67. Her oxygen saturation is 96%.     Chief Complaint: Foot Pain (Patient presents for eval of right great toe pain x1 week.)    70 year old female presents for evaluation right great toe pain. Pain began on vacation visiting the [place]. Patient states on return trip she had to walk extremely fast through airport to make it to changed gate and so the distal part of toe was a little red and swollen, but this has resolved since resting.         Constitution: Negative for activity change and unexpected weight change.   HENT:  Negative for hearing loss and trouble swallowing.    Neck: Negative for neck pain.   Cardiovascular:  Negative for chest pain and palpitations.   Eyes:  Negative for eye discharge.   Respiratory:  Negative for chest tightness and wheezing.    Gastrointestinal:  Negative for vomiting, constipation, diarrhea and dark colored stools.   Endocrine: excessive thirst and excessive urination.   Genitourinary:  Negative for dysuria and hematuria.   Musculoskeletal:  Negative for joint pain and joint swelling.   Neurological:  Negative for headaches.   Psychiatric/Behavioral:  Negative for confusion.       Objective:     Physical Exam   Constitutional: She does not appear ill. No distress.   HENT:   Head: Normocephalic and atraumatic.   Ears:   Right Ear: External ear normal.   Left Ear: External ear normal.   Eyes: Conjunctivae are normal. Right eye exhibits no discharge. Left eye exhibits no discharge. Extraocular movement intact   Cardiovascular: Normal rate.   Pulmonary/Chest: Effort normal. No respiratory distress.   Abdominal: Normal appearance.   Musculoskeletal:      Comments: No erythema or edema of right great toe.    Neurological: She is alert.   Skin: Skin is " warm, dry and not pale.         Comments: Right great toe nail with slight purplish hue, but NO hematoma. No erythema of nail folds. No discharge. Mylene is thick, but trimmed. no jaundice  Psychiatric: Her behavior is normal. Mood, judgment and thought content normal.   Nursing note and vitals reviewed.      Assessment:     1. Toe pain, right    2. Skin irritation        Plan:       Toe pain, right    Patient did trim toe nail after return. No evidence paronychia. There is no pain of the joint or tuft. Pain is directly under nail. Likely that wearing close toed shoes with long hard nail caused irritation to the nail bed. There is no hematoma to drain. Patient was concerned about gout, but reassured no evidence. Patient walks every morning for exercise. Advised if wearing tennis shoes increases pain after tomorrow's walk should consider wearing orthopedic flip for her walks or somehow buffering the nail from compressing on the shoe.    Skin irritation  -     triamcinolone acetonide 0.025% (KENALOG) 0.025 % cream; Apply topically 2 (two) times daily. to affected area  Dispense: 15 g; Refill: 1    Area to right arm where had a rash previously. Still with occasional itching. Requesting refill on below 2 medications. For skin irritation and what sounds like interigo.    Other orders  -     clotrimazole-betamethasone 1-0.05% (LOTRISONE) cream; Apply topically 2 (two) times daily.  Dispense: 15 g; Refill: 0      I spent a total of 48 minutes on the day of the visit.  This includes face to face time and non-face to face time preparing to see the patient (eg, review of tests), obtaining and/or reviewing separately obtained history, documenting clinical information in the electronic or other health record, independently interpreting results and communicating results to the patient/family/caregiver, or care coordinator.

## 2025-07-08 ENCOUNTER — PATIENT MESSAGE (OUTPATIENT)
Dept: HEMATOLOGY/ONCOLOGY | Facility: CLINIC | Age: 70
End: 2025-07-08
Payer: MEDICARE

## 2025-07-09 ENCOUNTER — TELEPHONE (OUTPATIENT)
Dept: HEMATOLOGY/ONCOLOGY | Facility: CLINIC | Age: 70
End: 2025-07-09
Payer: MEDICARE

## 2025-07-09 ENCOUNTER — TELEPHONE (OUTPATIENT)
Dept: SURGERY | Facility: CLINIC | Age: 70
End: 2025-07-09
Payer: MEDICARE

## 2025-07-09 ENCOUNTER — PATIENT MESSAGE (OUTPATIENT)
Dept: SURGERY | Facility: CLINIC | Age: 70
End: 2025-07-09
Payer: MEDICARE

## 2025-07-09 ENCOUNTER — DOCUMENTATION ONLY (OUTPATIENT)
Dept: SURGERY | Facility: CLINIC | Age: 70
End: 2025-07-09
Payer: MEDICARE

## 2025-07-09 ENCOUNTER — PATIENT MESSAGE (OUTPATIENT)
Dept: HEMATOLOGY/ONCOLOGY | Facility: CLINIC | Age: 70
End: 2025-07-09
Payer: MEDICARE

## 2025-07-09 NOTE — TELEPHONE ENCOUNTER
Copied from CRM #[id number]. Topic: Appointments - Appointment Scheduling  >> Jul 8, 2025  4:09 PM Brooke wrote:  Type:  Appointment Request    Name of Caller:Leticia Anthony   When is the first available appointment?No access    Symptoms:MRI F/u     Would the patient rather a call back or a response via [facility]? Call back     Best Call Back Number:[phone]    Additional Information: pt states she would like to schedule her follow up for after her Mri. Pt states she would like to set it up for after the MRI as soon as possible. Please call back with further assistance.

## 2025-07-09 NOTE — TELEPHONE ENCOUNTER
Called patient and left message to call back to set up a genetic appointment or respond to the portal message.

## 2025-07-10 NOTE — PROGRESS NOTES
Cancer Genetics  Hereditary and High-Risk Clinic  Department of Hematology and Oncology  [facility]    Date of Service:  25  Visit Provider:  Alicia Stack MS, [facility] [place]  Name: Leticia Trevino    : 1955    MRN: [id number]      Referring Provider  Lauren Mcclendon MD  [address] [place] [address]    Televisit Information  The patient location is: [place], [place]    The chief complaint leading to consultation is:  As below.    Visit type: audiovisual.      Face-to-face time with patient:  Approximately 28 minutes.    Approximately 66 minutes in total were spent on the day of this encounter, which includes face-to-face time and non-face-to-face time preparing to see the patient (e.g., review of records and tests), obtaining and/or reviewing separately obtained history, documenting clinical information in the electronic or other health record, independently interpreting results (not separately reported) and communicating results to the patient/family/caregiver, or care coordination (not separately reported).  Each patient to whom he or she provides medical services by telemedicine is:  (1) informed of the relationship between the physician and patient and the respective role of any other health care provider with respect to management of the patient; and (2) notified that he or she may decline to receive medical services by telemedicine and may withdraw from such care at any time.    IMPRESSION      Leticia Trevino is a pleasant 70 y.o. female patient seen in genetic counseling given her recent diagnosis of breast cancer. Leticia Trevino was unaccompanied today. We discussed that current genetic testing looks at additional genes related to hereditary breast cancer beyond just BRCA1 and BRCA2. The American Society of Breast Surgeons recommends that all patients with breast cancer should be offered genetic testing. She does have a family history of  "breast cancer in her sister, who was post-menopausal. We discussed that there are more moderately penetrate genes related to hereditary breast cancer, which we see post-menopausal women with breast cancer test positive for more often than highly penetrate genes like BRCA1 and BRCA2. Leticia Anthony opted for the [facility] BRCAPlus panel with reflex to the [facility] Cancer[facility] panel. A sample is scheduled to be collected 7/18/2025.    FOCUSED PERSONAL HISTORY     Chief Complaint: Genetic Evaluation (Breast cancer; family history of breast cancer)    History of Present Illness (HPI):  Leticia Trevino ("Leticia Anthony"), 70 y.o., assigned female sex at birth, is established with the [facility] but new to me.  She was referred by Breast Surgery for hereditary cancer risk assessment given her recent diagnosis of breast cancer.    Cancer History  New diagnosis of left breast cancer.   She is established with breast surgery    PATHOLOGY:     06/17/2025 [facility]  FINAL PATHOLOGY:  BREAST, LEFT, 3:00, BIOPSY:  - Invasive ductal carcinoma with lobular features.  - Size of invasive carcinoma:  11 MM in greatest contiguous linear dimension.  - [place] Histologic Score: Grade 1 of 3.                    Tubule Formation:  3                    Nuclear Pleomorphism:  1                    Mitotic Activity:  1  - Ductal carcinoma in-situ (DCIS), intermediate nuclear grade, solid type, without central necrosis is present.  - Size of DCIS:  2 MM.  - No lymphovascular invasion.  - Microcalcifications:  Seen in association with invasive carcinoma.  - BREAST BIOMARKERS:  ER:  Positive (95% of tumor cells demonstrate strong nuclear immunoreactivity).  VT:  Negative.  [id number]:  Negative (0).  Ki-67 proliferative index:  10%.  Comments: Areas of DCIS demonstrate intact p63 immunoreactivity with positive E-cadherin staining, supporting the finding of DCIS.  E-cadherin immunohistochemical staining is also positive within " areas of invasive carcinoma, supporting the finding of invasive ductal carcinoma with lobular features.  Dr. ANAHY Robbins has reviewed the case and concurs with the diagnosis.  Immunohistochemical staining is interpreted in the setting of appropriate positive and negative controls.    Focused Medical History  Previous germline cancer genetic testing:  No  Colonoscopy: Yes  Most recent colonoscopy: 2025  Colon polyp:  No  Mammogram: Yes  Breast MRI:  Scheduled  Pancreatitis:  No    Focused Surgical History  Reproductive organs:  s/p tubal ligation    Tobacco Use  Tobacco Use: Medium Risk (7/7/2025)    Patient History     Smoking Tobacco Use: Former     Smokeless Tobacco Use: Never     Passive Exposure: Not on file     Former smoker, quit ~25 years ago     FAMILY HISTORY     Cancer Pedigree     Sister with breast cancer at 54yo    Maternal:  Aunt with skin cancer at 59yo    Paternal:  No known history of cancer    A family history of birth defects, intellectual disability, SIDS, sudden early death, multiple miscarriages and consanguinity were denied. Please refer to above pedigree for further details. A larger copy has been scanned in the Media tab.     DISCUSSION     Approximately 5-10% of breast cancers are due to hereditary causes. Things that make us suspicious of a hereditary cause of cancer include the type of cancers seen in the family, number of people with cancer, ages of people with cancer, and certain cancer pathologies. The majority of hereditary breast cancers (>50%) are due to mutations in BRCA1 or BRCA2.  Around 12-30% are due to mutations in other highly penetrant genes, such as PALB2, PTEN and TP53, or in moderately penetrant genes, such as MELODIE, BARD1, and CHEK2.  The remaining percentage are caused by unknown/unidentified genes. The American Society of Breast Surgeons recommends that all patients with breast cancer should be offered genetics testing. Therefore, she was offered phenotype-driven and  broad panel testing. Leticia Anthony opted for the [facility] BRCAPlus panel with reflex to the [facility] CancerNext panel through the following  genes associated with hereditary breast, gastrointestinal, gynecologic, pancreatic, prostate, skin and other cancers:    BRCAPlus STAT Panel: MELODIE, BARD1, BRCA1, BRCA2, CDH1, CHEK2, NF1, PALB2, PTEN, RAD51C, RAD51D, STK11, TP53    Reflex to CancerNext: APC, MELODIE, AXIN2, BAP1, BARD1, BMPR1A, BRCA1, BRCA2, BRIP1, CDH1, CDK4, CDKN2A, CHEK2, EPCAM, FH, FLCN, GREM1, HOXB13, MBD4, MET, MLH1, MSH2, MSH3, MSH6, MUTYH, NF1, NTHL1, PALB2, PMS2, POLD1, POLE, PTEN, RAD51C, RAD51D, RPS20, SMAD4, STK11, TP53, TSC1, TSC2, VHL     We reviewed that mutations in the highly penetrant genes put an individual at a significantly increased risk of breast and other cancers.  There are established screening and surgery guidelines for these syndromes. Mutations in the moderately penetrant genes increase the risk of breast and other cancers, but less is understood regarding their role in cancer risk. There may not be standard screening or management guidelines for individuals who have mutations in these genes.    Furthermore, we discussed the psychosocial implications of a positive result, including anxiety, fear and guilt if a mutation is passed on to a child. Leticia Anthony expressed wanting information for her children.     Possible Results:    Positive (pathogenic or likely pathogenic variant): A genetic variant was found that is suspected or known to impact the function of the gene. The impact of a positive result on an individual's risk of cancer varies based on the gene, specific variant, individual's sex assigned at birth, personal cancer history, other health history (such as surgical history), and family history. A positive result can impact screening and risk management recommendations. However, there are not always available guidelines for management based on a specific gene variant. Family history and personal  risk factors should always be considered. Sometimes, a positive result can also have treatment or reproductive implications.   Negative: No clinically significant variants were reported in the tested genes. A negative result does not indicate that an individual cannot develop cancer or even that the individual is at average risk. An individual may still be at an increased risk for cancer based on personal risk factors or family history. Additionally, there could be a hereditary cancer predisposition that was not included in a chosen panel or is not detected with current technology.   Variant of Uncertain Significance (VUS): A variant was found. However, the lab does not have enough information to determine if the variant is benign (harmless) or pathogenic (impacts the function of the gene). The laboratory may update (reclassify) the variant over time as more information becomes available. When reclassified, most variants of uncertain significance are reclassified to benign/likely benign. Typically, it is not recommended to  based on the presence of a VUS. The chance of finding a VUS varies based on the test performed. Generally, the chance of finding a VUS increases with the number of genes tested and decreases with the amount of testing of that gene (genes that are tested more frequently or for a longer period of time have a lower VUS rate).    Genetic Mutation Inheritance:  When an individual has a gene mutation, their first-degree relatives (parents, children, and siblings) each have a 50% chance of carrying the same mutation. Other, more distant blood relatives can also be at risk of carrying the same mutation. At-risk relatives of an individual with a mutation should consider genetic testing to help determine their risk for cancer.     Genetic Discrimination: The Genetic Information Nondiscrimination Act of 2008 (ALETHEA) is a [place] federal law that provides some protections against the use of an  "individual's genetic information by their health insurer and by their employer. Title I of ALETHEA prohibits most health insurers (except for insurance obtained through a job with the  or the Federal Employees Health Benefits Plan) from utilizing an individual's genetic information to make decisions regarding insurance eligibility or premium charges. Title II of ALETHEA prohibits covered entities from requesting or requiring the genetic information of employees and applicants and from using said information to make employment decisions. This does not apply to employers with fewer than 15 employees or to the .  ALETHEA also does not protect individuals from genetic discrimination by any other type of policy or entity, including but not limited to life insurance, disability insurance, long-term care insurance,  benefits, and  Health Services benefits.    There is also a possibility for the patient to incur out-of-pocket costs related to this testing. Leticia Anthony appeared to have a good understanding of the information as she asked appropriate questions.  Leticia Anthony received comprehensive counseling regarding panel testing and has elected to proceed with this testing. A sample was scheduled to be submitted on 7/18/2025 to [facility].  Leticia Anthony's results should be available in approximately 3 weeks.  In the meantime, she is welcome to contact me if she has any questions, concerns, or updates to her family history.       ASSESSMENT / PLAN      Leticia Trevino ("Leticia Anthony"), 70 y.o., presented today for hereditary cancer risk assessment and genetic counseling given her recent diagnosis of breast cancer:    Proceeding with the [facility] BRCAPlus panel with reflex to the [facility] CancerNext panel  Patient understands results will likely not be available before next appointment with Breast Surgery  Blood draw scheduled for 7/18/2025  I will call her in ~10 days with hereditary breast cancer genetic results, and in ~3 " weeks with the rest of her results      ICD-10-CM ICD-9-CM   1. Malignant neoplasm of overlapping sites of left breast in female, estrogen receptor positive  C50.812 174.8    Z17.0 V86.0   2. Family history of malignant neoplasm of breast  Z80.3 V16.3   3. Encounter for nonprocreative genetic counseling  Z71.83 V26.33   4. Family history of skin cancer  Z80.8 V16.8     1. Malignant neoplasm of overlapping sites of left breast in female, estrogen receptor positive  - Ambulatory referral/consult to Genetics  - Miscellaneous Test, Sendout [facility] Test, DNA+RNA, Please use kit provided; Future    2. Family history of malignant neoplasm of breast  - Miscellaneous Test, Sendout [facility] Test, DNA+RNA, Please use kit provided; Future    3. Encounter for nonprocreative genetic counseling  - Miscellaneous Test, Sendout [facility] Test, DNA+RNA, Please use kit provided; Future    4. Family history of skin cancer  - Miscellaneous Test, Sendout [facility] Test, DNA+RNA, Please use kit provided; Future       Genetic Test Information  Testing lab: [facility]   Test panel: BRCAPlus with Reflex to CancerNext    ICD-10 code(s): C50.812, Z17.0, Z80.3, Z71.83, Z80.8   Verbal informed consent: Obtained   Written informed consent: To Be Obtained   Specimen type: Blood  (Patient denies blood disorders that would necessitate a skin fibroblast specimen)   Specimen collection by: [facility]   Specimen collection date: 07/18/2025   Results expected by: Approximately 2-3 weeks after the genetic testing lab receives the specimen   Results disclosure plan: Post-test visit if positive or complex result; otherwise, results will be communicated through phone call       Follow-up:  No follow-ups on file.    Questions were encouraged and answered to the patient's satisfaction, and she verbalized understanding of the information and agreement with the plan.         Approximately 28 minutes were spent face-to-face with the  patient.  Approximately 66 minutes in total were spent on this encounter, which includes face-to-face time and non-face-to-face time preparing to see the patient (e.g., review of tests), obtaining and/or reviewing separately obtained history, documenting clinical information in the electronic or other health record, independently interpreting results (not separately reported) and communicating results to the patient/family/caregiver, or care coordination (not separately reported).     This assessment is based on the history and reports provided, as well as the current scientific knowledge regarding cancer genetics.         Alicia Stack MS, [facility] [place]  Genetic Counselor, Hereditary and High-Risk Clinic  Department of Hematology and Oncology  [facility]    [facility]        CC:  Dr. Lauren Mcclendon

## 2025-07-14 ENCOUNTER — NURSE TRIAGE (OUTPATIENT)
Dept: ADMINISTRATIVE | Facility: CLINIC | Age: 70
End: 2025-07-14
Payer: MEDICARE

## 2025-07-14 NOTE — TELEPHONE ENCOUNTER
Patient has questions regarding her upcoming labs. All questions and concerns were addressed. Advised the patient to call back with any further questions or if symptoms worsen.      Reason for Disposition   Health information question, no triage required and triager able to answer question    Protocols used: Information Only Call - No Triage-A-

## 2025-07-15 ENCOUNTER — LAB VISIT (OUTPATIENT)
Dept: LAB | Facility: HOSPITAL | Age: 70
End: 2025-07-15
Attending: SURGERY
Payer: MEDICARE

## 2025-07-15 ENCOUNTER — PATIENT MESSAGE (OUTPATIENT)
Dept: HEMATOLOGY/ONCOLOGY | Facility: CLINIC | Age: 70
End: 2025-07-15
Payer: MEDICARE

## 2025-07-15 DIAGNOSIS — Z01.812 PRE-OPERATIVE LABORATORY EXAMINATION: ICD-10-CM

## 2025-07-15 DIAGNOSIS — C50.812 MALIGNANT NEOPLASM OF OVERLAPPING SITES OF LEFT BREAST IN FEMALE, ESTROGEN RECEPTOR POSITIVE: ICD-10-CM

## 2025-07-15 DIAGNOSIS — Z17.0 MALIGNANT NEOPLASM OF OVERLAPPING SITES OF LEFT BREAST IN FEMALE, ESTROGEN RECEPTOR POSITIVE: ICD-10-CM

## 2025-07-15 LAB
ABSOLUTE EOSINOPHIL (OHS): 0.12 K/UL
ABSOLUTE MONOCYTE (OHS): 0.41 K/UL (ref 0.3–1)
ABSOLUTE NEUTROPHIL COUNT (OHS): 3.65 K/UL (ref 1.8–7.7)
ALBUMIN SERPL BCP-MCNC: 3.9 G/DL (ref 3.5–5.2)
ALP SERPL-CCNC: 46 UNIT/L (ref 40–150)
ALT SERPL W/O P-5'-P-CCNC: 16 UNIT/L (ref 10–44)
ANION GAP (OHS): 8 MMOL/L (ref 8–16)
AST SERPL-CCNC: 18 UNIT/L (ref 11–45)
BASOPHILS # BLD AUTO: 0.03 K/UL
BASOPHILS NFR BLD AUTO: 0.6 %
BILIRUB SERPL-MCNC: 0.5 MG/DL (ref 0.1–1)
BUN SERPL-MCNC: 25 MG/DL (ref 8–23)
CALCIUM SERPL-MCNC: 9.3 MG/DL (ref 8.7–10.5)
CHLORIDE SERPL-SCNC: 110 MMOL/L (ref 95–110)
CO2 SERPL-SCNC: 24 MMOL/L (ref 23–29)
CREAT SERPL-MCNC: 1.1 MG/DL (ref 0.5–1.4)
ERYTHROCYTE [DISTWIDTH] IN BLOOD BY AUTOMATED COUNT: 14.5 % (ref 11.5–14.5)
GFR SERPLBLD CREATININE-BSD FMLA CKD-EPI: 55 ML/MIN/1.73/M2
GLUCOSE SERPL-MCNC: 96 MG/DL (ref 70–110)
HCT VFR BLD AUTO: 45.7 % (ref 37–48.5)
HGB BLD-MCNC: 14.5 GM/DL (ref 12–16)
IMM GRANULOCYTES # BLD AUTO: 0.02 K/UL (ref 0–0.04)
IMM GRANULOCYTES NFR BLD AUTO: 0.4 % (ref 0–0.5)
LYMPHOCYTES # BLD AUTO: 0.97 K/UL (ref 1–4.8)
MCH RBC QN AUTO: 28.4 PG (ref 27–31)
MCHC RBC AUTO-ENTMCNC: 31.7 G/DL (ref 32–36)
MCV RBC AUTO: 90 FL (ref 82–98)
NUCLEATED RBC (/100WBC) (OHS): 0 /100 WBC
PLATELET # BLD AUTO: 206 K/UL (ref 150–450)
PMV BLD AUTO: 11.1 FL (ref 9.2–12.9)
POTASSIUM SERPL-SCNC: 4.7 MMOL/L (ref 3.5–5.1)
PROT SERPL-MCNC: 7.3 GM/DL (ref 6–8.4)
RBC # BLD AUTO: 5.1 M/UL (ref 4–5.4)
RELATIVE EOSINOPHIL (OHS): 2.3 %
RELATIVE LYMPHOCYTE (OHS): 18.7 % (ref 18–48)
RELATIVE MONOCYTE (OHS): 7.9 % (ref 4–15)
RELATIVE NEUTROPHIL (OHS): 70.1 % (ref 38–73)
SODIUM SERPL-SCNC: 142 MMOL/L (ref 136–145)
WBC # BLD AUTO: 5.2 K/UL (ref 3.9–12.7)

## 2025-07-15 PROCEDURE — 84075 ASSAY ALKALINE PHOSPHATASE: CPT

## 2025-07-15 PROCEDURE — 36415 COLL VENOUS BLD VENIPUNCTURE: CPT | Mod: PO

## 2025-07-15 PROCEDURE — 85025 COMPLETE CBC W/AUTO DIFF WBC: CPT

## 2025-07-16 ENCOUNTER — HOSPITAL ENCOUNTER (OUTPATIENT)
Dept: RADIOLOGY | Facility: HOSPITAL | Age: 70
Discharge: HOME | End: 2025-07-16
Attending: SURGERY
Payer: MEDICARE

## 2025-07-16 ENCOUNTER — OFFICE VISIT (OUTPATIENT)
Dept: HEMATOLOGY/ONCOLOGY | Facility: CLINIC | Age: 70
End: 2025-07-16
Payer: MEDICARE

## 2025-07-16 DIAGNOSIS — Z80.3 FAMILY HISTORY OF MALIGNANT NEOPLASM OF BREAST: ICD-10-CM

## 2025-07-16 DIAGNOSIS — Z71.83 ENCOUNTER FOR NONPROCREATIVE GENETIC COUNSELING: ICD-10-CM

## 2025-07-16 DIAGNOSIS — C50.812 MALIGNANT NEOPLASM OF OVERLAPPING SITES OF LEFT BREAST IN FEMALE, ESTROGEN RECEPTOR POSITIVE: ICD-10-CM

## 2025-07-16 DIAGNOSIS — C50.812 MALIGNANT NEOPLASM OF OVERLAPPING SITES OF LEFT BREAST IN FEMALE, ESTROGEN RECEPTOR POSITIVE: Primary | ICD-10-CM

## 2025-07-16 DIAGNOSIS — Z80.8 FAMILY HISTORY OF SKIN CANCER: ICD-10-CM

## 2025-07-16 DIAGNOSIS — Z17.0 MALIGNANT NEOPLASM OF OVERLAPPING SITES OF LEFT BREAST IN FEMALE, ESTROGEN RECEPTOR POSITIVE: ICD-10-CM

## 2025-07-16 DIAGNOSIS — Z17.0 MALIGNANT NEOPLASM OF OVERLAPPING SITES OF LEFT BREAST IN FEMALE, ESTROGEN RECEPTOR POSITIVE: Primary | ICD-10-CM

## 2025-07-16 PROCEDURE — A9577 INJ MULTIHANCE: HCPCS | Mod: PN | Performed by: SURGERY

## 2025-07-16 PROCEDURE — 25500020 PHARM REV CODE 255: Mod: PN | Performed by: SURGERY

## 2025-07-16 PROCEDURE — 77049 MRI BREAST C-+ W/CAD BI: CPT | Mod: TC,PN

## 2025-07-16 PROCEDURE — 77049 MRI BREAST C-+ W/CAD BI: CPT | Mod: 26,,, | Performed by: RADIOLOGY

## 2025-07-16 RX ADMIN — GADOBENATE DIMEGLUMINE 15 ML: 529 INJECTION, SOLUTION INTRAVENOUS at 03:07

## 2025-07-18 ENCOUNTER — LAB VISIT (OUTPATIENT)
Dept: LAB | Facility: HOSPITAL | Age: 70
End: 2025-07-18
Attending: INTERNAL MEDICINE
Payer: MEDICARE

## 2025-07-18 DIAGNOSIS — Z80.8 FAMILY HISTORY OF SKIN CANCER: ICD-10-CM

## 2025-07-18 DIAGNOSIS — Z17.0 MALIGNANT NEOPLASM OF OVERLAPPING SITES OF LEFT BREAST IN FEMALE, ESTROGEN RECEPTOR POSITIVE: ICD-10-CM

## 2025-07-18 DIAGNOSIS — Z80.3 FAMILY HISTORY OF MALIGNANT NEOPLASM OF BREAST: ICD-10-CM

## 2025-07-18 DIAGNOSIS — Z71.83 ENCOUNTER FOR NONPROCREATIVE GENETIC COUNSELING: ICD-10-CM

## 2025-07-18 DIAGNOSIS — C50.812 MALIGNANT NEOPLASM OF OVERLAPPING SITES OF LEFT BREAST IN FEMALE, ESTROGEN RECEPTOR POSITIVE: ICD-10-CM

## 2025-07-18 PROCEDURE — 36415 COLL VENOUS BLD VENIPUNCTURE: CPT

## 2025-07-21 NOTE — PROGRESS NOTES
[facility]  MD Tashia Hooper, NP-C        Date of Service: 7/23/2025    Chief Complaint:   Leticia Trevino is a 70 y.o. female presenting today for  a new patient appointment to establish breast care. She does have a new diagnosis of left breast cancer. She is due for a discussion of her workup and treatment options.  She reports no interval changes on her self-breast examination.     History of Present Illness:   Mrs. Leticia Trevino presents on 7/23/2025 due to her recently diagnosed left breast cancer. She has no breast pain, nipple discharge or skin changes. She has had a left breast core biopsy. Patient was genetically tested as of 07/16/2025, results are pending. She does report a family history of breast cancer.  She did have bilateral breast MRI.  She presents to discuss results, for physical exam, and for preop for surgery.      PATHOLOGY:    06/17/2025 [facility]  FINAL PATHOLOGY:  BREAST, LEFT, 3:00, BIOPSY:  - Invasive ductal carcinoma with lobular features.  - Size of invasive carcinoma:  11 MM in greatest contiguous linear dimension.  - Genie Histologic Score: Grade 1 of 3.                    Tubule Formation:  3                    Nuclear Pleomorphism:  1                    Mitotic Activity:  1  - Ductal carcinoma in-situ (DCIS), intermediate nuclear grade, solid type, without central necrosis is present.  - Size of DCIS:  2 MM.  - No lymphovascular invasion.  - Microcalcifications:  Seen in association with invasive carcinoma.  - BREAST BIOMARKERS:  ER:  Positive (95% of tumor cells demonstrate strong nuclear immunoreactivity).  MI:  Negative.  LAL9VRO:  Negative (0).  Ki-67 proliferative index:  10%.  Comments: Areas of DCIS demonstrate intact p63 immunoreactivity with positive E-cadherin staining, supporting the finding of DCIS.  E-cadherin immunohistochemical staining is also positive within areas of invasive carcinoma,  supporting the finding of invasive ductal carcinoma with lobular features.  Dr. ANAHY Robbins has reviewed the case and concurs with the diagnosis.  Immunohistochemical staining is interpreted in the setting of appropriate positive and negative controls.      Past Medical History:   Diagnosis Date    Autoimmune inflammation of skeletal muscle     Breast cancer, left breast 06/18/2025    3 o'clock left breast    Class 2 obesity with alveolar hypoventilation, serious comorbidity, and body mass index (BMI) of 38.0 to 38.9 in adult 04/16/2021    Hypertension     Malignant neoplasm of overlapping sites of left breast in female, estrogen receptor positive 7/2/2025    Nocturnal hypoxemia due to obesity     07/18/2022, 07/19/2022 Home Sleep Study  2 night study MILD/BORDERLINE OBSTRUCTIVE SLEEP APNEA with overall AHI 9.5/hr ( 53 events): night # 2 Oxygen desaturation: 84%. SpO2 between 85% to 89% for 3 min. Patient snored 98% time above 50 .Heart rate range: 44 bpm - 74 bpm 6/28/2021 PSG No Significant Obstructive Sleep apnea(ALIX): AHI was 3.9/hr ( 17 events). The cumulative time under 88% oxygen     Positive D dimer 12/02/2022    Sleep apnea     does not uses CPAP      Past Surgical History:   Procedure Laterality Date    APPENDECTOMY      BREAST BIOPSY Left 06/18/2025    COLONOSCOPY, SCREENING, HIGH RISK PATIENT N/A 05/02/2025    Procedure: COLONOSCOPY, SCREENING, HIGH RISK PATIENT;  Surgeon: Bairon Hennessy MD;  Location: [facility];  Service: Endoscopy;  Laterality: N/A;    FOOT SURGERY Left     TONSILLECTOMY      TUBAL LIGATION        Current Medications[1]   Review of patient's allergies indicates:   Allergen Reactions    Sulfa (sulfonamide antibiotics) Hives and Itching    Poison oak extract Hives    Metal staples [surgical stainless steel] Rash     Allergic to metal      Social History     Tobacco Use    Smoking status: Former     Current packs/day: 0.00     Average packs/day: 0.5 packs/day for 29.6 years (14.8 ttl  pk-yrs)     Types: Cigarettes     Start date: 1970     Quit date: 11/3/1999     Years since quittin.7    Smokeless tobacco: Never   Substance Use Topics    Alcohol use: Yes     Comment: occasionally      Family History   Problem Relation Name Age of Onset    Aneurysm Mother Lata Anderson     Alcohol abuse Mother Lata Anderson     Stroke Mother Lata Anderson         aneurism    Stroke Father Rober Soto Sr     Alcohol abuse Father Rober Soto Sr     Breast cancer Sister Paty sousa 55    Arthritis Sister Paty sousa     Memory loss Sister Paty sousa     Cancer Sister Paty sousa     COPD Sister      Drug abuse Son Vincvenu     Skin cancer Maternal Aunt Romy 60    Ovarian cancer Neg Hx          Review of Systems   Constitutional:  Negative for diaphoresis, fatigue, fever and night sweats.   HENT:  Negative for dental problem, drooling, ear discharge and ear pain.    Eyes:  Negative for pain, discharge, redness and itching.   Cardiovascular:  Negative for chest pain, palpitations, leg swelling and claudication.   Gastrointestinal:  Negative for abdominal distention, abdominal pain, constipation and diarrhea.   Endocrine: Negative for cold intolerance and heat intolerance.   Integumentary:  Positive for breast mass. Negative for color change, breast discharge and breast tenderness.   Neurological:  Negative for facial asymmetry and coordination difficulties.   Hematological:  Negative for adenopathy. Does not bruise/bleed easily.   Psychiatric/Behavioral:  Negative for agitation, behavioral problems and confusion.    All other systems reviewed and are negative.  Breast: Positive for mass.Negative for tenderness       Physical Exam   Pulmonary/Chest: Effort normal and breath sounds normal. No tachypnea and no bradypnea. She has no decreased breath sounds. She has no wheezes. She has no rhonchi. She has no rales. Right breast exhibits no inverted nipple, no mass, no nipple discharge, no skin change and no  tenderness. Left breast exhibits mass (Dense nodularity, 3 o'clock position, 10 cm from the nipple with biopsy related changes), skin change (Biopsy related changes) and tenderness (Associated with biopsy). Left breast exhibits no inverted nipple and no nipple discharge. No breast swelling or bleeding. Breasts are symmetrical.   Genitourinary: No breast swelling or bleeding.        MAMMOGRAM REPORT:      06/13/2025  Facility:  [facility] [address] [place] [address]  [phone]  Name: Leticia Trevino  MRN: [id number]  Result: Mammo Digital Diagnostic Left with Jonathan (XPD)--US Breast Left Limited  Findings:  Mammo Digital Diagnostic Left with Jonathan (XPD): There are scattered areas of fibroglandular density. There is an irregularly shaped mass seen in the outer region of the left breast at 3 and 9 o'clock in the middle depth. The mass correlates with the prior mammogram finding.   US Breast Left Limited: There is a 14 mm x 11 mm irregularly shaped, heterogeneous mass seen in the outer region of the left breast at 3 o'clock in the middle depth. The mass correlates with the mass seen on the mammogram.  Ultrasound evaluation of the ipsilateral axilla was performed and demonstrates no suspicious lymph nodes.    Impression:  Left Mass: Left breast mass in the outer region at 3 and 9 o'clock in the middle depth. Assessment: 4C - Suspicious finding. Ultrasound-guided biopsy is recommended.   BI-RADS Category: Overall: 4 - Suspicious  Recommendation: Ultrasound-guided biopsy is recommended.    06/06/2025   Facility:  [facility] [address] [place] [address]  [phone]  Name: Leticia Trevino  MRN: [id number]  Result:   Mammo Digital Screening Bilat w/ Jonathan (XPD)  Findings: There are scattered areas of fibroglandular density.   Left: There is a focal asymmetry seen in the outer region of the left breast in the middle depth.   Right: There is no  evidence of suspicious masses, calcifications, or other abnormal findings in the right breast.  Impression:  Left Focal Asymmetry: Left breast focal asymmetry in the outer region in the middle depth. Assessment: 0 - Incomplete. Special Views: Spot Compression View and Ultrasound are recommended.   Right: There is no mammographic evidence of malignancy in the right breast.  BI-RADS Category: Overall: 0 - Incomplete: Needs Additional Imaging Evaluation  Recommendation: Diagnostic mammogram including spot compression views is recommended.  Breast ultrasound is recommended.    ULTRASOUND REPORT: SEE ABOVE    MRI REPORT:     07/16/2025 [facility]  MRI Breast w/wo Contrast, w/CAD, Bilateral  Findings:  Left breast: There is a focal area of clumped nodular enhancement in the left breast approximately 9 cm from the nipple close to the 3 o'clock position measuring 15 x 12 mm corresponding to the area biopsied and seen on mammogram and ultrasound.  No additional abnormal areas in the left breast.  Negative for internal mammary or axillary lymphadenopathy.    Right breast: No suspicious findings.  Negative for internal mammary or axillary adenopathy.  Impression: Focal suspicious area of clumped nodular enhancement left breast close to the 3 o'clock position corresponding to that seen on the mammogram and that was biopsied.  -no evidence of multifocal/multicentric disease  -no adenopathy   -negative right breast  BI-RADS Category: Overall: 6 - Known Biopsy-Proven Malignancy  Recommendation: Surgical Consult is recommended for the left breast.      ASSESSMENT AND PLAN:  Malignant neoplasm of overlapping sites of left breast in female, estrogen receptor positive  Assessment & Plan:  70-year-old female with left breast clinical prognostic Stage IA [id number] invasive ductal carcinoma with lobular features, estrogen receptor 95%, progesterone receptor 0%, HER2 Ana 0%, grade 1, KS 67 10%.  #1.  Left breast cancer, 3 o'clock position -  Mastectomy vs. Radar Localized Lumpectomy was discussed in detail with the patient.  The patient understands if lumpectomy is performed, the radiologist will place a reflector to localize the cancer.  The reflector, previously placed clip, and cancer will be removed.  I will place additional clips to julián the location of the lumpectomy cavity.  The risks and benefits of mastectomy and radar localized lumpectomy were discussed in detail with the patient to include, but not limited to, bleeding, infection, pain, scar, nerve or vessel injury, need for another procedure, risk of anesthesia, myocardial infarction, cerebral vascular accident, thrombosis, death.  #2.  Axillary Assessment - Left [place] Lymphnode Biopsy with injection of radioactive nucleotide and blue dye - the patient understands that we will place radioactive nucleotide as well as blue dye in order to assist in idenification of the sentinel lymphnode.  If the patient is undergoing mastectomy or lumpectomy after neoadjuvanct chemotherapy, and a sentinel lymphnode is positive for cancer, I will perform an axillary lymphnode dissection.  If the sentinel lymphnode is negative for cancer, no further surgery will be performed.  However, if the patient is undergoing lumpectomy, and the sentinel lymphnode shows evidence of metastatic cancer, she will likely proceed forward with radiation.  The risks and benefits of sentinel lymphnode biopsy and axillary lymphnode dissection was discussed in detail with the patient to include, but not limited to, lymphedema.  #3. Medical Oncology - Patient will follow up with Dr. Tucker to discuss chemotherapy and/or endocrine therapy.   #4. Radiation Oncology - the patient does understand that she may require post operative radiation in order to decrease the risk for local recurrence.  The patient will be seeing Dr. Flores in [place] to further discuss.   #5. Reconstructive Surgery - If mastectomy is required, the patient will  likely be a candidate for immediate versus delayed reconstruction.  If lumpectomy is performed, the patient may be a candidate for bilateral oncoplastic procedure. She will further discuss this with Dr. See.  #6.  Bilateral Breast MRI - 7/16/25 - showed unifocal disease in the left breast, no evidence of multifocal/multicentric or contralateral disease.  #7. Genetic Testing -we did discuss genetic testing, she is postmenopausal, she had a sister with breast cancer, however she was post-menapausal.  She will likely decline, but wishes to think more about this.  #8.  Pathology consult-to review outside slides.  #9.  Healthy nutrition, routine exercise, minimal alcohol, no smoking-risk reduction.   #10.  Left breast radar localized lumpectomy, left axillary sentinel lymph node biopsy, bilateral oncoplastic procedure.    Patient will be treated according to NCCN guidelines.        Estrogen receptor positive        NOTE:::We viewed her films together at today's visit.  We discussed the multiple views obtained and the important findings.  Even benign changes were mentioned and her questions were answered.  She knows that she may receive a formal letter or report from the Radiologist.  She is to contact us if she has questions.    ASSESSMENT and PLAN OF CARE                     [1]   Current Outpatient Medications:     clotrimazole-betamethasone 1-0.05% (LOTRISONE) cream, Apply topically 2 (two) times daily., Disp: 15 g, Rfl: 0    enalapril (VASOTEC) 2.5 MG tablet, Take 1 tablet by mouth once daily, Disp: 90 tablet, Rfl: 3    triamcinolone acetonide 0.025% (KENALOG) 0.025 % cream, Apply topically 2 (two) times daily. to affected area, Disp: 15 g, Rfl: 1

## 2025-07-23 ENCOUNTER — OFFICE VISIT (OUTPATIENT)
Dept: SURGERY | Facility: CLINIC | Age: 70
End: 2025-07-23
Payer: MEDICARE

## 2025-07-23 VITALS — BODY MASS INDEX: 38.3 KG/M2 | HEIGHT: 59 IN | WEIGHT: 190 LBS

## 2025-07-23 DIAGNOSIS — Z17.0 ESTROGEN RECEPTOR POSITIVE: ICD-10-CM

## 2025-07-23 DIAGNOSIS — C50.812 MALIGNANT NEOPLASM OF OVERLAPPING SITES OF LEFT BREAST IN FEMALE, ESTROGEN RECEPTOR POSITIVE: Primary | ICD-10-CM

## 2025-07-23 DIAGNOSIS — Z17.0 MALIGNANT NEOPLASM OF OVERLAPPING SITES OF LEFT BREAST IN FEMALE, ESTROGEN RECEPTOR POSITIVE: Primary | ICD-10-CM

## 2025-07-23 PROBLEM — C50.912 INFILTRATING DUCTAL CARCINOMA OF LEFT BREAST: Status: RESOLVED | Noted: 2025-06-19 | Resolved: 2025-07-23

## 2025-07-23 PROCEDURE — 99999 PR PBB SHADOW E&M-EST. PATIENT-LVL II: CPT | Mod: PBBFAC,,, | Performed by: SURGERY

## 2025-07-28 ENCOUNTER — TELEPHONE (OUTPATIENT)
Dept: HEMATOLOGY/ONCOLOGY | Facility: CLINIC | Age: 70
End: 2025-07-28
Payer: MEDICARE

## 2025-07-28 NOTE — TELEPHONE ENCOUNTER
Negative BRCAplus through [facility]. Tested genes: MELODIE, BARD1, BRCA1, BRCA2, CDH1, CHEK2, NF1, PALB2, PTEN, RAD51C, RAD51D, STK11 and TP53 (sequencing and deletion/duplication).

## 2025-07-29 ENCOUNTER — PATIENT MESSAGE (OUTPATIENT)
Dept: HEMATOLOGY/ONCOLOGY | Facility: CLINIC | Age: 70
End: 2025-07-29
Payer: MEDICARE

## 2025-07-30 NOTE — ASSESSMENT & PLAN NOTE
70-year-old female with left breast clinical prognostic Stage IA [id number] invasive ductal carcinoma with lobular features, estrogen receptor 95%, progesterone receptor 0%, HER2 Ana 0%, grade 1, KS 67 10%.  #1.  Left breast cancer, 3 o'clock position - Mastectomy vs. Radar Localized Lumpectomy was discussed in detail with the patient.  The patient understands if lumpectomy is performed, the radiologist will place a reflector to localize the cancer.  The reflector, previously placed clip, and cancer will be removed.  I will place additional clips to julián the location of the lumpectomy cavity.  The risks and benefits of mastectomy and radar localized lumpectomy were discussed in detail with the patient to include, but not limited to, bleeding, infection, pain, scar, nerve or vessel injury, need for another procedure, risk of anesthesia, myocardial infarction, cerebral vascular accident, thrombosis, death.  #2.  Axillary Assessment - Left [place] Lymphnode Biopsy with injection of radioactive nucleotide and blue dye - the patient understands that we will place radioactive nucleotide as well as blue dye in order to assist in idenification of the sentinel lymphnode.  If the patient is undergoing mastectomy or lumpectomy after neoadjuvanct chemotherapy, and a sentinel lymphnode is positive for cancer, I will perform an axillary lymphnode dissection.  If the sentinel lymphnode is negative for cancer, no further surgery will be performed.  However, if the patient is undergoing lumpectomy, and the sentinel lymphnode shows evidence of metastatic cancer, she will likely proceed forward with radiation.  The risks and benefits of sentinel lymphnode biopsy and axillary lymphnode dissection was discussed in detail with the patient to include, but not limited to, lymphedema.  #3. Medical Oncology - Patient will follow up with Dr. Tucker to discuss chemotherapy and/or endocrine therapy.   #4. Radiation Oncology - the patient does  understand that she may require post operative radiation in order to decrease the risk for local recurrence.  The patient will be seeing Dr. Flores in [place] to further discuss.   #5. Reconstructive Surgery - If mastectomy is required, the patient will likely be a candidate for immediate versus delayed reconstruction.  If lumpectomy is performed, the patient may be a candidate for bilateral oncoplastic procedure. She will further discuss this with Dr. See.  #6.  Bilateral Breast MRI - 7/16/25 - showed unifocal disease in the left breast, no evidence of multifocal/multicentric or contralateral disease.  #7. Genetic Testing -we did discuss genetic testing, she is postmenopausal, she had a sister with breast cancer, however she was post-menapausal.  She will likely decline, but wishes to think more about this.  #8.  Pathology consult-to review outside slides.  #9.  Healthy nutrition, routine exercise, minimal alcohol, no smoking-risk reduction.   #10.  Left breast radar localized lumpectomy, left axillary sentinel lymph node biopsy, bilateral oncoplastic procedure.    Patient will be treated according to NCCN guidelines.

## 2025-07-31 ENCOUNTER — TELEPHONE (OUTPATIENT)
Dept: PRIMARY CARE CLINIC | Facility: CLINIC | Age: 70
End: 2025-07-31
Payer: MEDICARE

## 2025-08-01 NOTE — TELEPHONE ENCOUNTER
Patient states she is too busy right now to schedule a follow up appt. She will call back when she is ready to schedule.

## 2025-08-01 NOTE — TELEPHONE ENCOUNTER
Left message for pt requesting a call back at earliest convenience. Patient still needs to follow up with Dr. Abraham.

## 2025-08-14 ENCOUNTER — TELEPHONE (OUTPATIENT)
Dept: HEMATOLOGY/ONCOLOGY | Facility: CLINIC | Age: 70
End: 2025-08-14
Payer: MEDICARE

## 2025-08-15 ENCOUNTER — TELEPHONE (OUTPATIENT)
Dept: PRIMARY CARE CLINIC | Facility: CLINIC | Age: 70
End: 2025-08-15
Payer: MEDICARE

## 2025-08-15 DIAGNOSIS — Z01.818 PREOP TESTING: Primary | ICD-10-CM

## 2025-08-18 ENCOUNTER — TELEPHONE (OUTPATIENT)
Dept: HEMATOLOGY/ONCOLOGY | Facility: CLINIC | Age: 70
End: 2025-08-18
Payer: MEDICARE

## 2025-08-19 ENCOUNTER — TELEPHONE (OUTPATIENT)
Dept: HEMATOLOGY/ONCOLOGY | Facility: CLINIC | Age: 70
End: 2025-08-19
Payer: MEDICARE

## 2025-08-20 ENCOUNTER — OFFICE VISIT (OUTPATIENT)
Dept: SURGERY | Facility: CLINIC | Age: 70
End: 2025-08-20
Payer: MEDICARE

## 2025-08-20 VITALS — BODY MASS INDEX: 38.3 KG/M2 | WEIGHT: 190 LBS | HEIGHT: 59 IN

## 2025-08-20 DIAGNOSIS — C50.812 MALIGNANT NEOPLASM OF OVERLAPPING SITES OF LEFT BREAST IN FEMALE, ESTROGEN RECEPTOR POSITIVE: Primary | ICD-10-CM

## 2025-08-20 DIAGNOSIS — Z17.0 MALIGNANT NEOPLASM OF OVERLAPPING SITES OF LEFT BREAST IN FEMALE, ESTROGEN RECEPTOR POSITIVE: Primary | ICD-10-CM

## 2025-08-20 DIAGNOSIS — Z80.3 FAMILY HISTORY OF MALIGNANT NEOPLASM OF BREAST: ICD-10-CM

## 2025-08-20 DIAGNOSIS — Z17.0 ESTROGEN RECEPTOR POSITIVE: ICD-10-CM

## 2025-08-20 PROCEDURE — 99999 PR PBB SHADOW E&M-EST. PATIENT-LVL II: CPT | Mod: PBBFAC,,, | Performed by: SURGERY

## 2025-08-21 ENCOUNTER — PATIENT MESSAGE (OUTPATIENT)
Dept: PRIMARY CARE CLINIC | Facility: CLINIC | Age: 70
End: 2025-08-21
Payer: MEDICARE

## 2025-08-22 ENCOUNTER — OFFICE VISIT (OUTPATIENT)
Dept: PRIMARY CARE CLINIC | Facility: CLINIC | Age: 70
End: 2025-08-22
Payer: MEDICARE

## 2025-08-22 ENCOUNTER — HOSPITAL ENCOUNTER (OUTPATIENT)
Dept: CARDIOLOGY | Facility: HOSPITAL | Age: 70
Discharge: HOME | End: 2025-08-22
Attending: INTERNAL MEDICINE
Payer: MEDICARE

## 2025-08-22 ENCOUNTER — HOSPITAL ENCOUNTER (OUTPATIENT)
Dept: RADIOLOGY | Facility: HOSPITAL | Age: 70
Discharge: HOME | End: 2025-08-22
Attending: INTERNAL MEDICINE
Payer: MEDICARE

## 2025-08-22 VITALS
WEIGHT: 192.44 LBS | HEART RATE: 70 BPM | HEIGHT: 59 IN | OXYGEN SATURATION: 99 % | SYSTOLIC BLOOD PRESSURE: 112 MMHG | BODY MASS INDEX: 38.8 KG/M2 | DIASTOLIC BLOOD PRESSURE: 74 MMHG

## 2025-08-22 DIAGNOSIS — Z01.818 PREOPERATIVE EVALUATION TO RULE OUT SURGICAL CONTRAINDICATION: Primary | ICD-10-CM

## 2025-08-22 DIAGNOSIS — Z01.818 PREOP TESTING: ICD-10-CM

## 2025-08-22 DIAGNOSIS — Z80.3 FAMILY HISTORY OF MALIGNANT NEOPLASM OF BREAST: ICD-10-CM

## 2025-08-22 LAB
OHS QRS DURATION: 94 MS
OHS QTC CALCULATION: 396 MS

## 2025-08-22 PROCEDURE — 1159F MED LIST DOCD IN RCRD: CPT | Mod: CPTII,S$GLB,, | Performed by: INTERNAL MEDICINE

## 2025-08-22 PROCEDURE — 1101F PT FALLS ASSESS-DOCD LE1/YR: CPT | Mod: CPTII,S$GLB,, | Performed by: INTERNAL MEDICINE

## 2025-08-22 PROCEDURE — 4010F ACE/ARB THERAPY RXD/TAKEN: CPT | Mod: CPTII,S$GLB,, | Performed by: INTERNAL MEDICINE

## 2025-08-22 PROCEDURE — 99999 PR PBB SHADOW E&M-EST. PATIENT-LVL III: CPT | Mod: PBBFAC,,, | Performed by: INTERNAL MEDICINE

## 2025-08-22 PROCEDURE — 99214 OFFICE O/P EST MOD 30 MIN: CPT | Mod: S$GLB,,, | Performed by: INTERNAL MEDICINE

## 2025-08-22 PROCEDURE — 3078F DIAST BP <80 MM HG: CPT | Mod: CPTII,S$GLB,, | Performed by: INTERNAL MEDICINE

## 2025-08-22 PROCEDURE — 1126F AMNT PAIN NOTED NONE PRSNT: CPT | Mod: CPTII,S$GLB,, | Performed by: INTERNAL MEDICINE

## 2025-08-22 PROCEDURE — 71046 X-RAY EXAM CHEST 2 VIEWS: CPT | Mod: TC,PO

## 2025-08-22 PROCEDURE — 3074F SYST BP LT 130 MM HG: CPT | Mod: CPTII,S$GLB,, | Performed by: INTERNAL MEDICINE

## 2025-08-22 PROCEDURE — 3044F HG A1C LEVEL LT 7.0%: CPT | Mod: CPTII,S$GLB,, | Performed by: INTERNAL MEDICINE

## 2025-08-22 PROCEDURE — 93005 ELECTROCARDIOGRAM TRACING: CPT | Mod: PO

## 2025-08-22 PROCEDURE — 3008F BODY MASS INDEX DOCD: CPT | Mod: CPTII,S$GLB,, | Performed by: INTERNAL MEDICINE

## 2025-08-22 PROCEDURE — 3288F FALL RISK ASSESSMENT DOCD: CPT | Mod: CPTII,S$GLB,, | Performed by: INTERNAL MEDICINE

## 2025-08-22 PROCEDURE — 71046 X-RAY EXAM CHEST 2 VIEWS: CPT | Mod: 26,,, | Performed by: STUDENT IN AN ORGANIZED HEALTH CARE EDUCATION/TRAINING PROGRAM

## 2025-08-22 PROCEDURE — 93010 ELECTROCARDIOGRAM REPORT: CPT | Mod: ,,, | Performed by: INTERNAL MEDICINE

## 2025-08-22 PROCEDURE — 1160F RVW MEDS BY RX/DR IN RCRD: CPT | Mod: CPTII,S$GLB,, | Performed by: INTERNAL MEDICINE

## 2025-08-22 PROCEDURE — 1157F ADVNC CARE PLAN IN RCRD: CPT | Mod: CPTII,S$GLB,, | Performed by: INTERNAL MEDICINE

## 2025-08-25 ENCOUNTER — TELEPHONE (OUTPATIENT)
Dept: PREADMISSION TESTING | Facility: HOSPITAL | Age: 70
End: 2025-08-25
Payer: MEDICARE

## 2025-08-25 ENCOUNTER — PATIENT MESSAGE (OUTPATIENT)
Dept: PREADMISSION TESTING | Facility: HOSPITAL | Age: 70
End: 2025-08-25
Payer: MEDICARE

## 2025-08-28 ENCOUNTER — HOSPITAL ENCOUNTER (OUTPATIENT)
Dept: RADIOLOGY | Facility: HOSPITAL | Age: 70
Discharge: HOME | End: 2025-08-28
Attending: SURGERY
Payer: MEDICARE

## 2025-08-28 ENCOUNTER — ANESTHESIA EVENT (OUTPATIENT)
Dept: SURGERY | Facility: HOSPITAL | Age: 70
End: 2025-08-28
Payer: MEDICARE

## 2025-08-28 DIAGNOSIS — C50.812 MALIGNANT NEOPLASM OF OVERLAPPING SITES OF LEFT BREAST IN FEMALE, ESTROGEN RECEPTOR POSITIVE: ICD-10-CM

## 2025-08-28 DIAGNOSIS — R92.8 ABNORMAL MAMMOGRAM: ICD-10-CM

## 2025-08-28 DIAGNOSIS — Z17.0 MALIGNANT NEOPLASM OF OVERLAPPING SITES OF LEFT BREAST IN FEMALE, ESTROGEN RECEPTOR POSITIVE: ICD-10-CM

## 2025-08-28 DIAGNOSIS — Z01.812 PRE-OPERATIVE LABORATORY EXAMINATION: ICD-10-CM

## 2025-08-28 PROCEDURE — 77065 DX MAMMO INCL CAD UNI: CPT | Mod: 26,LT,, | Performed by: RADIOLOGY

## 2025-08-28 PROCEDURE — 19285 PERQ DEV BREAST 1ST US IMAG: CPT | Mod: LT,,, | Performed by: RADIOLOGY

## 2025-08-28 PROCEDURE — 77065 DX MAMMO INCL CAD UNI: CPT | Mod: TC,LT

## 2025-08-28 PROCEDURE — 27200940 US BREAST RADAR REFLECTOR LOCALIZATION W/GUIDANCE, 1ST LESION, LEFT

## 2025-08-29 ENCOUNTER — TELEPHONE (OUTPATIENT)
Dept: PREADMISSION TESTING | Facility: HOSPITAL | Age: 70
End: 2025-08-29
Payer: MEDICARE

## 2025-08-29 ENCOUNTER — PATIENT MESSAGE (OUTPATIENT)
Dept: RESPIRATORY THERAPY | Facility: HOSPITAL | Age: 70
End: 2025-08-29
Payer: MEDICARE

## 2025-09-02 ENCOUNTER — ANESTHESIA (OUTPATIENT)
Dept: SURGERY | Facility: HOSPITAL | Age: 70
End: 2025-09-02
Payer: MEDICARE

## 2025-09-02 ENCOUNTER — HOSPITAL ENCOUNTER (OUTPATIENT)
Dept: RADIOLOGY | Facility: HOSPITAL | Age: 70
Discharge: HOME | End: 2025-09-02
Attending: SURGERY | Admitting: SURGERY
Payer: MEDICARE

## 2025-09-02 ENCOUNTER — NURSE TRIAGE (OUTPATIENT)
Dept: ADMINISTRATIVE | Facility: CLINIC | Age: 70
End: 2025-09-02
Payer: MEDICARE

## 2025-09-02 DIAGNOSIS — C50.812 MALIGNANT NEOPLASM OF OVERLAPPING SITES OF LEFT BREAST IN FEMALE, ESTROGEN RECEPTOR POSITIVE: ICD-10-CM

## 2025-09-02 DIAGNOSIS — Z01.812 PRE-OPERATIVE LABORATORY EXAMINATION: ICD-10-CM

## 2025-09-02 DIAGNOSIS — Z17.0 MALIGNANT NEOPLASM OF OVERLAPPING SITES OF LEFT BREAST IN FEMALE, ESTROGEN RECEPTOR POSITIVE: ICD-10-CM

## 2025-09-02 PROCEDURE — 63600175 PHARM REV CODE 636 W HCPCS: Performed by: NURSE PRACTITIONER

## 2025-09-02 PROCEDURE — 63600175 PHARM REV CODE 636 W HCPCS: Performed by: SURGERY

## 2025-09-02 PROCEDURE — 63600175 PHARM REV CODE 636 W HCPCS: Performed by: ANESTHESIOLOGY

## 2025-09-02 PROCEDURE — 38792 RA TRACER ID OF SENTINL NODE: CPT | Mod: TC

## 2025-09-02 PROCEDURE — 25000003 PHARM REV CODE 250: Performed by: ANESTHESIOLOGY

## 2025-09-02 PROCEDURE — A9520 TC99 TILMANOCEPT DIAG 0.5MCI: HCPCS | Performed by: SURGERY

## 2025-09-02 RX ORDER — ROCURONIUM BROMIDE 10 MG/ML
INJECTION, SOLUTION INTRAVENOUS
Status: DISCONTINUED | OUTPATIENT
Start: 2025-09-02 | End: 2025-09-02

## 2025-09-02 RX ORDER — MIDAZOLAM HYDROCHLORIDE 1 MG/ML
INJECTION INTRAMUSCULAR; INTRAVENOUS
Status: DISCONTINUED | OUTPATIENT
Start: 2025-09-02 | End: 2025-09-02

## 2025-09-02 RX ORDER — LIDOCAINE HYDROCHLORIDE 20 MG/ML
INJECTION, SOLUTION EPIDURAL; INFILTRATION; INTRACAUDAL; PERINEURAL
Status: DISCONTINUED | OUTPATIENT
Start: 2025-09-02 | End: 2025-09-02

## 2025-09-02 RX ORDER — ONDANSETRON HYDROCHLORIDE 2 MG/ML
INJECTION, SOLUTION INTRAVENOUS
Status: DISCONTINUED | OUTPATIENT
Start: 2025-09-02 | End: 2025-09-02

## 2025-09-02 RX ORDER — SUCCINYLCHOLINE CHLORIDE 20 MG/ML
INJECTION INTRAMUSCULAR; INTRAVENOUS
Status: DISCONTINUED | OUTPATIENT
Start: 2025-09-02 | End: 2025-09-02

## 2025-09-02 RX ORDER — EPHEDRINE SULFATE 50 MG/ML
INJECTION, SOLUTION INTRAVENOUS
Status: DISCONTINUED | OUTPATIENT
Start: 2025-09-02 | End: 2025-09-02

## 2025-09-02 RX ORDER — PROPOFOL 10 MG/ML
VIAL (ML) INTRAVENOUS
Status: DISCONTINUED | OUTPATIENT
Start: 2025-09-02 | End: 2025-09-02

## 2025-09-02 RX ORDER — FENTANYL CITRATE 50 UG/ML
INJECTION, SOLUTION INTRAMUSCULAR; INTRAVENOUS
Status: DISCONTINUED | OUTPATIENT
Start: 2025-09-02 | End: 2025-09-02

## 2025-09-02 RX ORDER — PHENYLEPHRINE HYDROCHLORIDE 10 MG/ML
INJECTION INTRAVENOUS
Status: DISCONTINUED | OUTPATIENT
Start: 2025-09-02 | End: 2025-09-02

## 2025-09-02 RX ORDER — DEXMEDETOMIDINE IN 0.9 % NACL 20 MCG/5ML
SYRINGE (ML) INTRAVENOUS
Status: DISCONTINUED | OUTPATIENT
Start: 2025-09-02 | End: 2025-09-02

## 2025-09-02 RX ORDER — DEXAMETHASONE SODIUM PHOSPHATE 4 MG/ML
INJECTION, SOLUTION INTRA-ARTICULAR; INTRALESIONAL; INTRAMUSCULAR; INTRAVENOUS; SOFT TISSUE
Status: DISCONTINUED | OUTPATIENT
Start: 2025-09-02 | End: 2025-09-02

## 2025-09-02 RX ORDER — ACETAMINOPHEN 10 MG/ML
INJECTION, SOLUTION INTRAVENOUS
Status: DISCONTINUED | OUTPATIENT
Start: 2025-09-02 | End: 2025-09-02

## 2025-09-02 RX ADMIN — EPHEDRINE SULFATE 12.5 MG: 50 INJECTION INTRAVENOUS at 07:09

## 2025-09-02 RX ADMIN — SODIUM CHLORIDE, POTASSIUM CHLORIDE, SODIUM LACTATE AND CALCIUM CHLORIDE: 600; 310; 30; 20 INJECTION, SOLUTION INTRAVENOUS at 08:09

## 2025-09-02 RX ADMIN — FAMOTIDINE 20 MG: 10 INJECTION INTRAVENOUS at 08:09

## 2025-09-02 RX ADMIN — PHENYLEPHRINE HYDROCHLORIDE 100 MCG: 10 INJECTION INTRAVENOUS at 08:09

## 2025-09-02 RX ADMIN — Medication 4 MCG: at 08:09

## 2025-09-02 RX ADMIN — LIDOCAINE HYDROCHLORIDE 80 MG: 20 INJECTION, SOLUTION EPIDURAL; INFILTRATION; INTRACAUDAL; PERINEURAL at 07:09

## 2025-09-02 RX ADMIN — PHENYLEPHRINE HYDROCHLORIDE 100 MCG: 10 INJECTION INTRAVENOUS at 09:09

## 2025-09-02 RX ADMIN — ONDANSETRON 4 MG: 2 INJECTION INTRAMUSCULAR; INTRAVENOUS at 09:09

## 2025-09-02 RX ADMIN — DIPHENHYDRAMINE HYDROCHLORIDE 25 MG: 50 INJECTION INTRAMUSCULAR; INTRAVENOUS at 08:09

## 2025-09-02 RX ADMIN — PROPOFOL 150 MG: 10 INJECTION, EMULSION INTRAVENOUS at 07:09

## 2025-09-02 RX ADMIN — ROCURONIUM BROMIDE 10 MG: 10 SOLUTION INTRAVENOUS at 07:09

## 2025-09-02 RX ADMIN — FENTANYL CITRATE 25 MCG: 50 INJECTION, SOLUTION INTRAMUSCULAR; INTRAVENOUS at 08:09

## 2025-09-02 RX ADMIN — SUGAMMADEX 200 MG: 100 INJECTION, SOLUTION INTRAVENOUS at 09:09

## 2025-09-02 RX ADMIN — DEXAMETHASONE SODIUM PHOSPHATE 8 MG: 4 INJECTION, SOLUTION INTRA-ARTICULAR; INTRALESIONAL; INTRAMUSCULAR; INTRAVENOUS; SOFT TISSUE at 07:09

## 2025-09-02 RX ADMIN — SUCCINYLCHOLINE CHLORIDE 140 MG: 20 INJECTION, SOLUTION INTRAMUSCULAR; INTRAVENOUS; PARENTERAL at 07:09

## 2025-09-02 RX ADMIN — FENTANYL CITRATE 25 MCG: 50 INJECTION, SOLUTION INTRAMUSCULAR; INTRAVENOUS at 09:09

## 2025-09-02 RX ADMIN — GLYCOPYRROLATE 0.2 MG: 0.2 INJECTION, SOLUTION INTRAMUSCULAR; INTRAVENOUS at 07:09

## 2025-09-02 RX ADMIN — MIDAZOLAM HYDROCHLORIDE 2 MG: 1 INJECTION, SOLUTION INTRAMUSCULAR; INTRAVENOUS at 07:09

## 2025-09-02 RX ADMIN — CEFAZOLIN 2 G: 2 INJECTION, POWDER, FOR SOLUTION INTRAMUSCULAR; INTRAVENOUS at 07:09

## 2025-09-02 RX ADMIN — SODIUM CHLORIDE, POTASSIUM CHLORIDE, SODIUM LACTATE AND CALCIUM CHLORIDE: 600; 310; 30; 20 INJECTION, SOLUTION INTRAVENOUS at 07:09

## 2025-09-02 RX ADMIN — ACETAMINOPHEN 1000 MG: 10 INJECTION, SOLUTION INTRAVENOUS at 08:09

## 2025-09-02 RX ADMIN — TILMANOCEPT 505 MICROCURIE: KIT at 07:09

## 2025-09-02 RX ADMIN — FENTANYL CITRATE 100 MCG: 50 INJECTION, SOLUTION INTRAMUSCULAR; INTRAVENOUS at 07:09

## 2025-09-03 ENCOUNTER — TELEPHONE (OUTPATIENT)
Dept: SURGERY | Facility: CLINIC | Age: 70
End: 2025-09-03
Payer: MEDICARE